# Patient Record
Sex: FEMALE | Race: BLACK OR AFRICAN AMERICAN | NOT HISPANIC OR LATINO | Employment: PART TIME | URBAN - METROPOLITAN AREA
[De-identification: names, ages, dates, MRNs, and addresses within clinical notes are randomized per-mention and may not be internally consistent; named-entity substitution may affect disease eponyms.]

---

## 2019-07-01 ENCOUNTER — TRANSFERRED RECORDS (OUTPATIENT)
Dept: MULTI SPECIALTY CLINIC | Facility: CLINIC | Age: 29
End: 2019-07-01

## 2019-07-01 LAB — PAP SMEAR - HIM PATIENT REPORTED: NEGATIVE

## 2019-08-03 ENCOUNTER — HOSPITAL ENCOUNTER (EMERGENCY)
Facility: CLINIC | Age: 29
Discharge: HOME | End: 2019-08-03
Attending: EMERGENCY MEDICINE | Admitting: EMERGENCY MEDICINE
Payer: COMMERCIAL

## 2019-08-03 ENCOUNTER — APPOINTMENT (OUTPATIENT)
Dept: GENERAL RADIOLOGY | Facility: CLINIC | Age: 29
End: 2019-08-03
Attending: EMERGENCY MEDICINE
Payer: COMMERCIAL

## 2019-08-03 VITALS
TEMPERATURE: 99.2 F | HEART RATE: 92 BPM | RESPIRATION RATE: 20 BRPM | OXYGEN SATURATION: 100 % | DIASTOLIC BLOOD PRESSURE: 82 MMHG | SYSTOLIC BLOOD PRESSURE: 111 MMHG

## 2019-08-03 DIAGNOSIS — J20.8 ACUTE VIRAL BRONCHITIS: ICD-10-CM

## 2019-08-03 PROCEDURE — 94640 AIRWAY INHALATION TREATMENT: CPT

## 2019-08-03 PROCEDURE — 71046 X-RAY EXAM CHEST 2 VIEWS: CPT

## 2019-08-03 PROCEDURE — 99283 EMERGENCY DEPT VISIT LOW MDM: CPT | Mod: 25

## 2019-08-03 PROCEDURE — 25000125 ZZHC RX 250: Performed by: EMERGENCY MEDICINE

## 2019-08-03 RX ORDER — IPRATROPIUM BROMIDE AND ALBUTEROL SULFATE 2.5; .5 MG/3ML; MG/3ML
3 SOLUTION RESPIRATORY (INHALATION) ONCE
Status: COMPLETED | OUTPATIENT
Start: 2019-08-03 | End: 2019-08-03

## 2019-08-03 RX ORDER — CODEINE PHOSPHATE AND GUAIFENESIN 10; 100 MG/5ML; MG/5ML
1 SOLUTION ORAL EVERY 4 HOURS PRN
Qty: 120 ML | Refills: 0 | Status: SHIPPED | OUTPATIENT
Start: 2019-08-03 | End: 2020-06-16

## 2019-08-03 RX ORDER — ALBUTEROL SULFATE 90 UG/1
2 AEROSOL, METERED RESPIRATORY (INHALATION)
Qty: 1 INHALER | Refills: 1 | Status: SHIPPED | OUTPATIENT
Start: 2019-08-03 | End: 2020-06-16

## 2019-08-03 RX ORDER — PREDNISONE 20 MG/1
40 TABLET ORAL DAILY
Qty: 10 TABLET | Refills: 0 | Status: SHIPPED | OUTPATIENT
Start: 2019-08-03 | End: 2019-09-03

## 2019-08-03 RX ADMIN — IPRATROPIUM BROMIDE AND ALBUTEROL SULFATE 3 ML: .5; 3 SOLUTION RESPIRATORY (INHALATION) at 18:21

## 2019-08-03 ASSESSMENT — ENCOUNTER SYMPTOMS
HEMATURIA: 0
SHORTNESS OF BREATH: 1
FEVER: 0
WHEEZING: 1
RHINORRHEA: 1
DYSURIA: 0
NAUSEA: 0
ABDOMINAL PAIN: 0
VOMITING: 0
COUGH: 1
FREQUENCY: 0
CHEST TIGHTNESS: 1

## 2019-08-03 NOTE — ED PROVIDER NOTES
History     Chief Complaint:  Cough    HPI   Baljinder Perdomo is a 29 year old female who presents for evaluation of a cough. Yesterday, the patient started to develop a dry unproductive cough and some rhinorrhea. That evening, the patient started to develop chest tightness, wheezing, and shortness of breath. Due to her persistent symptoms today, the patient came into the ED for evaluation. She reports similar symptoms in the past with pneumonia. She notes that she has previously had to use an inhaler but has not been diagnosed with asthma. Otherwise, she has not had any recent fever, ear pain, chest pain, leg swelling, abdominal pain, nausea, vomiting, dysuria, hematuria, or urinary frequency.     Allergies:  NKDA      Medications:    Tylenol    Past Medical History:    The patient denies any relevant past medical history.     Past Surgical History:    Breast augmentation surgery   Dental surgery     Family History:    History reviewed. No pertinent family history.     Social History:  Tobacco use:    Never smoker   Alcohol use:    Negative   Marital status:    Single   Accompanied to ED by:  Alone      Review of Systems   Constitutional: Negative for fever.   HENT: Positive for rhinorrhea. Negative for ear pain.    Respiratory: Positive for cough, chest tightness, shortness of breath and wheezing.    Cardiovascular: Negative for chest pain and leg swelling.   Gastrointestinal: Negative for abdominal pain, nausea and vomiting.   Genitourinary: Negative for dysuria, frequency and hematuria.   All other systems reviewed and are negative.      Physical Exam   First Vitals:  BP: 113/78  Heart Rate: 100  Temp: 99.2  F (37.3  C)  Resp: 20  SpO2: 99 %      Physical Exam  General: Well appearing, nontoxic. Resting comfortably  Head:  Scalp, face, and head appear normal  Eyes:  Pupils are equal, round, and reactive to light    Conjunctivae non-injected and sclerae white  ENT:    The external nose is normal    Pinnae are  normal. Bilateral TMs clear without erythema, bulging, or effusion. Auditory canals normal.     The oropharynx is normal, mucous membranes moist    Posterior pharynx clear without swelling, exudates or erythema     Uvula is in the midline  Neck:  Normal range of motion    There is no rigidity noted    Trachea is in the midline  CV:  Regular rate and rhythm     Normal S1/S2, no S3/S4    No murmur or rub. Radial pulses 2+ bilaterally   Resp:  Lungs are clear and equal bilaterally    There is no tachypnea    No increased work of breathing    No rales, wheezing, or rhonchi    + wheezing on forced expiratory maneuver.   GI:  Abdomen is soft, no rigidity or guarding    No distension, or mass    No tenderness or rebound tenderness   MS:  Normal muscular tone    Symmetric motor strength    No lower extremity edema  Skin:  No rash or acute skin lesions noted  Neuro:  Awake and alert    Speech is normal and fluent    Moves all extremities spontaneously  Psych: Normal affect.  Appropriate interactions.      Emergency Department Course     Imaging:  Radiographic findings were communicated with the patient who voiced understanding of the findings.    XR Chest:  FINDINGS: Mild thoracic curve. Normal heart size. Lungs clear.  Per radiology.     Interventions:  1821 Duoneb 3 mL Nebulization     Emergency Department Course:  Nursing notes and vitals reviewed.  1935: I performed an exam of the patient as documented above.     Findings and plan explained to the Patient. Patient discharged home with instructions regarding supportive care, medications, and reasons to return. The importance of close follow-up was reviewed. The patient was prescribed an Albuterol inhaler, Robitussin AC, and Prednisone.      Impression & Plan      Medical Decision Making:  Baljinder Perdomo is a 29 year old female who presented to the ED with symptoms consistent with bronchitis. The patient appears non-toxic without evidence of respiratory distress. The  patient has normal oxygen saturations with normal work of breathing. There are no signs of serious systemic illness and the patient has normal mentation without confusion. CXR normal without evidence of pneumonia, pleural effusion, or pneumothorax. Doubt PE given patient is not hypoxic or tachycardic in the setting of URI symptoms. Reviewed with patient that cough and airway hyperreactivity may persist for several weeks. Will treat symptomatically with albuterol inhaler, prednisone and cough suppressant. I discussed the need to seek immediate evaluation for respiratory distress, confusion, fever, or for any other questions or concerns. Recommended follow-up with primary care provider in three to five days if not improving. The patient was in agreement with plan and discharged in satisfactory condition with all questions answered.      Diagnosis:    ICD-10-CM   1. Acute viral bronchitis J20.8       Disposition:  Discharged to home with an Albuterol inhaler, Robitussin AC, and Prednisone.     Discharge Medications:  albuterol 108 (90 Base) MCG/ACT inhaler  Commonly known as:  PROAIR HFA/PROVENTIL HFA/VENTOLIN HFA  2 puffs, Inhalation, EVERY 2 HOURS PRN     guaiFENesin-codeine 100-10 MG/5ML solution  Commonly known as:  ROBITUSSIN AC  1 tsp., Oral, EVERY 4 HOURS PRN     predniSONE 20 MG tablet  Commonly known as:  DELTASONE  40 mg, Oral, DAILY            Adam SAMANO, am serving as a scribe at 7:35 PM on 8/3/2019 to document services personally performed by Dr. Schneider, based on my observations and the provider's statements to me.    [facility] EMERGENCY DEPARTMENT       Kike Schneider MD  08/04/19 [id number]

## 2019-08-03 NOTE — ED TRIAGE NOTES
Cough for the past 2 days, increased wheezing and tightness   Reports cough is non productive and dry     Denies fever

## 2019-08-03 NOTE — ED AVS SNAPSHOT
[facility] Emergency Department  [address]  [facility] [address]  Phone:  [phone]  Fax:  [phone]                                    Baljinder Perdomo   MRN: [id number]    Department:  [facility] Emergency Department   Date of Visit:  8/3/2019           After Visit Summary Signature Page    I have received my discharge instructions, and my questions have been answered. I have discussed any challenges I see with this plan with the nurse or doctor.    ..........................................................................................................................................  Patient/Patient Representative Signature      ..........................................................................................................................................  Patient Representative Print Name and Relationship to Patient    ..................................................               ................................................  Date                                   Time    ..........................................................................................................................................  Reviewed by Signature/Title    ...................................................              ..............................................  Date                                               Time          22EPIC Rev 08/18

## 2019-09-03 ENCOUNTER — OFFICE VISIT (OUTPATIENT)
Dept: INTERNAL MEDICINE | Facility: CLINIC | Age: 29
End: 2019-09-03
Payer: COMMERCIAL

## 2019-09-03 VITALS
SYSTOLIC BLOOD PRESSURE: 110 MMHG | WEIGHT: 101 LBS | BODY MASS INDEX: 17.89 KG/M2 | DIASTOLIC BLOOD PRESSURE: 60 MMHG | HEIGHT: 63 IN | TEMPERATURE: 98.8 F | RESPIRATION RATE: 18 BRPM | OXYGEN SATURATION: 98 % | HEART RATE: 100 BPM

## 2019-09-03 DIAGNOSIS — J21.9 ACUTE BRONCHIOLITIS WITH BRONCHOSPASM: Primary | ICD-10-CM

## 2019-09-03 PROCEDURE — 99203 OFFICE O/P NEW LOW 30 MIN: CPT | Performed by: INTERNAL MEDICINE

## 2019-09-03 RX ORDER — ALBUTEROL SULFATE 0.83 MG/ML
2.5 SOLUTION RESPIRATORY (INHALATION) EVERY 6 HOURS PRN
Qty: 90 VIAL | Refills: 1 | Status: SHIPPED | OUTPATIENT
Start: 2019-09-03 | End: 2020-06-16

## 2019-09-03 RX ORDER — AZITHROMYCIN 250 MG/1
TABLET, FILM COATED ORAL
Qty: 6 TABLET | Refills: 0 | Status: SHIPPED | OUTPATIENT
Start: 2019-09-03 | End: 2020-06-16

## 2019-09-03 RX ORDER — PREDNISONE 10 MG/1
TABLET ORAL
Qty: 15 TABLET | Refills: 0 | Status: SHIPPED | OUTPATIENT
Start: 2019-09-03 | End: 2020-06-16

## 2019-09-03 ASSESSMENT — MIFFLIN-ST. JEOR: SCORE: 1152.26

## 2019-09-03 ASSESSMENT — PAIN SCALES - GENERAL: PAINLEVEL: NO PAIN (0)

## 2019-09-03 NOTE — NURSING NOTE
"Chief Complaint   Patient presents with     Hospital F/U     ED 8/3/2019 , ongoing breathing isssues, cough, SOB with minimal activity. Albuterol inhaler helping some.      Physical     [facility].      initial /60 (BP Location: Right arm, Patient Position: Chair, Cuff Size: Adult Regular)   Pulse 100   Temp 98.8  F (37.1  C) (Oral)   Resp 18   Ht 1.6 m (5' 3\")   Wt 45.8 kg (101 lb)   LMP 08/29/2019   SpO2 98%   BMI 17.89 kg/m   Estimated body mass index is 17.89 kg/m  as calculated from the following:    Height as of this encounter: 1.6 m (5' 3\").    Weight as of this encounter: 45.8 kg (101 lb)..  bp completed using cuff size regular    "

## 2019-09-03 NOTE — PROGRESS NOTES
Dr Chaidez's note    Patient's instructions / PLAN:                                                        Plan:  1. Azithromycin 250 mg, take 2 tablets today, then 1 tablet a day for the next 4 days (  antibiotic)   2. Prednisone 10 mg   -- take 2 tabs daily for 5 days then take 1 tab daily for another 5 days   3. Albuterol nebulizer 3 times a day   Nebulizer : You may find it at :   [facility]   [address], [address]   [place] [address], [facility], [phone]     ASSESSMENT & PLAN:                                                      (J21.9) Acute bronchiolitis with bronchospasm  (primary encounter diagnosis)  Comment: Ongoing  Plan: azithromycin (ZITHROMAX) 250 MG tablet,         albuterol (PROVENTIL) (2.5 MG/3ML) 0.083% neb         solution, predniSONE (DELTASONE) 10 MG tablet,         order for DME        as above     Chief Complaint:                                                      Bronchitis     SUBJECTIVE:                                                    History of present illness      --  ED 8/3/19 - Bronchitis   --  UC 8/19/19 - Cough; Wheezing    Bronchitis  --  Prednisone 4 mg w/ some relief  --  She has not experienced these Sx before (family does not have same Sx)  --  Dry cough, no phlegm  --  She is having problems breathing   --  She wakes every 2 hrs, x 2 puffs of inhaler w/ little relief at night  --  Does not have nebulizer at home   --  x 1 months  --  She had no problems w/ exercising before  --  Recent mold exposure      She reports normal PAP 2018 - [facility]     ROS:                                                      ROS: negative for fever, chills,chest pain, vomiting, abdominal pain, leg swelling. Positive for  cough, wheezes, and shortness of breath    A 10-point review of systems was obtained.  Those pertinent are above and in the in the Subjective section.  The rest of the  "systems are negative.      This document serves as a record of the services and decisions personally performed and made by Kaylynn Davis MD. It was created on her behalf by Angelina Caldera, a trained medical scribe. The creation of this document is based on the provider's statements to the medical scribe.  Angelina Caledra September 3, 2019 11:31 AM      OBJECTIVE:         Physical exam:  /60 (BP Location: Right arm, Patient Position: Chair, Cuff Size: Adult Regular)   Pulse 100   Temp 98.8  F (37.1  C) (Oral)   Resp 18   Ht 1.6 m (5' 3\")   Wt 45.8 kg (101 lb)   LMP 08/29/2019   SpO2 98%   BMI 17.89 kg/m     NAD, appears comfortable  Skin: no rashes   HEENT: PERRLA, EOMI, pink conjunctiva, anicteric sclerae, bilateral tympanic membranes are clinically normal, oropharynx is normal color  Neck: supple, no JVD,  No thyroidmegaly. Lymph nodes nonpalpable cervical and supraclavicular.  Chest: clear to auscultation bilaterally, good respiratory effort. She coughs very frequently   Heart: S1 S2, RRR, no mgr appreciated  Abdomen: soft, not tender, no hepatosplenomegaly or masses appreciated, no abdominal bruit, present bowel sounds  Extremities: no edema,   Neurologic: A, Ox3, no focal signs appreciated    PMHx: reviewed  Past Medical History:   Diagnosis Date     NO ACTIVE PROBLEMS       PSHx: reviewed  Past Surgical History:   Procedure Laterality Date     BREAST SURGERY  2/15/2014    augmentation     DENTAL SURGERY  2014        Meds: reviewed  Current Outpatient Medications   Medication Sig Dispense Refill     albuterol (PROAIR HFA/PROVENTIL HFA/VENTOLIN HFA) 108 (90 Base) MCG/ACT inhaler Inhale 2 puffs into the lungs every 2 hours as needed for shortness of breath / dyspnea or wheezing 1 Inhaler 1     albuterol (PROVENTIL) (2.5 MG/3ML) 0.083% neb solution Take 1 vial (2.5 mg) by nebulization every 6 hours as needed for shortness of breath / dyspnea or wheezing 90 vial 1     azithromycin " (ZITHROMAX) 250 MG tablet Two tablets first day, then one tablet daily for four days. 6 tablet 0     guaiFENesin-codeine (ROBITUSSIN AC) 100-10 MG/5ML solution Take 5 mLs by mouth every 4 hours as needed for cough 120 mL 0     order for DME Equipment being ordered: Nebulizer 1 Device 0     predniSONE (DELTASONE) 10 MG tablet Take 2 tablets daily for 5 days then 1 tablet daily for 5 days 15 tablet 0     methocarbamol (ROBAXIN) 500 MG tablet Take 1 tablet (500 mg) by mouth 3 times daily as needed for muscle spasms (muscle pain/spasm) (Patient not taking: Reported on 9/3/2019) 30 tablet 0       Soc Hx: reviewed She doesn't smoke  Fam Hx: reviewed  Family History   Problem Relation Age of Onset     Family History Negative Mother      Family History Negative Father         The information in this document, created by the medical scribe for me, accurately reflects the services I personally performed and the decisions made by me. I have reviewed and approved this document for accuracy prior to leaving the patient care area.  September 3, 2019 11:47 AM    Susan Chaidez MD  Internal Medicine

## 2019-09-03 NOTE — PATIENT INSTRUCTIONS
Plan:  1. Azithromycin 250 mg, take 2 tablets today, then 1 tablet a day for the next 4 days (  antibiotic)   2. Prednisone 10 mg   -- take 2 tabs daily for 5 days then take 1 tab daily for another 5 days   3. Albuterol nebulizer 3 times a day   Nebulizer : You may find it at :   [facility]   [address], [address]   [place] [address], [place], [phone]

## 2019-09-14 ENCOUNTER — NURSE TRIAGE (OUTPATIENT)
Dept: NURSING | Facility: CLINIC | Age: 29
End: 2019-09-14

## 2019-09-14 NOTE — TELEPHONE ENCOUNTER
"Clinic Action Needed:No    Reason for Call: \"I'm pretty sure that I have a yeast infection, can you call me in a prescription\".  Advised caller that I'm not able to treat over the phone.  Offered Triage, she declined.  She asked what she should do.  I advised that if I could triage her, I'd be able to make a better recommendation.  She again declined, triage, I told her her options over the weekend would be either UC/ER.  Urgent care should be sufficient, however I can't make a complete recommendation with out proper triage.  Caller said, thanks, I know where I will go and ended call.     Routed to: Not routed.    Esme Turner RN  [facility]      "

## 2020-03-02 ENCOUNTER — HEALTH MAINTENANCE LETTER (OUTPATIENT)
Age: 30
End: 2020-03-02

## 2020-06-16 ENCOUNTER — VIRTUAL VISIT (OUTPATIENT)
Dept: INTERNAL MEDICINE | Facility: CLINIC | Age: 30
End: 2020-06-16
Payer: COMMERCIAL

## 2020-06-16 VITALS
HEART RATE: 89 BPM | DIASTOLIC BLOOD PRESSURE: 74 MMHG | RESPIRATION RATE: 18 BRPM | WEIGHT: 100 LBS | TEMPERATURE: 98.5 F | SYSTOLIC BLOOD PRESSURE: 115 MMHG | BODY MASS INDEX: 17.72 KG/M2 | OXYGEN SATURATION: 99 % | HEIGHT: 63 IN

## 2020-06-16 DIAGNOSIS — R59.1 HEAD AND NECK LYMPHADENOPATHY: ICD-10-CM

## 2020-06-16 DIAGNOSIS — R39.198 DIFFICULTY URINATING: Primary | ICD-10-CM

## 2020-06-16 PROCEDURE — 99214 OFFICE O/P EST MOD 30 MIN: CPT | Performed by: INTERNAL MEDICINE

## 2020-06-16 RX ORDER — SULFAMETHOXAZOLE/TRIMETHOPRIM 800-160 MG
1 TABLET ORAL 2 TIMES DAILY
Qty: 10 TABLET | Refills: 0 | Status: SHIPPED | OUTPATIENT
Start: 2020-06-16 | End: 2021-02-22

## 2020-06-16 ASSESSMENT — MIFFLIN-ST. JEOR: SCORE: 1142.73

## 2020-06-16 NOTE — PATIENT INSTRUCTIONS
Plan:  1. Urine test today -  - [address],  Bactrim antibiotic 1 tablet twice a day  2. If  symptoms persist - make an appointment with urology doctor  3. If the lymph node persists in 3-4 weeks please schedule neck ultrasound including the thyroid

## 2020-06-16 NOTE — PROGRESS NOTES
"  Patient's instructions / PLAN:                                                        Plan:  1. Urine test today -  - [address],  Bactrim antibiotic 1 tablet twice a day  2. If  symptoms persist - make an appointment with urology doctor  3. If the lymph node persists in 3-4 weeks please schedule neck ultrasound including the thyroid    To schedule this test you may call Scheduling center at [phone]         ASSESSMENT & PLAN:                                                      (R34.210) Difficulty urinating  (primary encounter diagnosis)  Comment:   Plan: UA with Microscopic reflex to Culture, UROLOGY         ADULT REFERRAL, sulfamethoxazole-trimethoprim         (BACTRIM DS) 800-160 MG tablet            (R59.1) Head and neck lymphadenopathy  Comment:   Plan: US Head Neck Soft Tissue        as above        Chief Complaint:                                                      Lymph node  Urine symptoms      SUBJECTIVE:                                                    History of present illness     Neck nodule  -- on the R side  -- she noticed it 3 days ago. 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  Those pertinent are above and in the in the Subjective section.  The rest of the systems are negative.        OBJECTIVE:                                                    Physical Exam :    Blood pressure 115/74, pulse 89, temperature 98.5  F (36.9  C), temperature source Oral, resp. rate 18, height 1.6 m (5' 3\"), " weight 45.4 kg (100 lb), SpO2 99 %, not currently breastfeeding.   NAD, appears comfortable  Skin: no rashes   Neck: supple, no JVD, No thyroidmegaly. There is 1 cm soft nodule at the R mandibula angle Lymph nodes nonpalpable cervical and supraclavicular.  Chest: clear to auscultation bilaterally, good respiratory effort  Heart: S1 S2, RRR, no mgr appreciated  Abdomen: soft, not tender, no hepatosplenomegaly or masses appreciated, no abdominal bruit, present bowel sounds  Extremities: no edema,   Neurologic: A, Ox3, no focal signs appreciated    PMHx: reviewed  Past Medical History:   Diagnosis Date     NO ACTIVE PROBLEMS       PSHx: reviewed  Past Surgical History:   Procedure Laterality Date     BREAST SURGERY  2/15/2014    augmentation     DENTAL SURGERY  2014        Meds: reviewed  No current outpatient medications on file.       Soc Hx: reviewed  Fam Hx: reviewed          Susan Chaidez MD  Internal Medicine

## 2020-06-17 ENCOUNTER — TELEPHONE (OUTPATIENT)
Dept: UROLOGY | Facility: CLINIC | Age: 30
End: 2020-06-17

## 2020-06-17 NOTE — TELEPHONE ENCOUNTER
[facility] Call Center    Phone Message    May a detailed message be left on voicemail: yes     Reason for Call: Appointment Intake    Referring Provider Name: Susan Wolfe   Diagnosis and/or Symptoms: Difficulty urinating - per covid protocol, please review    Action Taken: Message routed to:  [facility] ([facility] Urology    Travel Screening: Not Applicable

## 2020-09-16 ENCOUNTER — APPOINTMENT (OUTPATIENT)
Dept: ULTRASOUND IMAGING | Facility: CLINIC | Age: 30
End: 2020-09-16
Attending: PHYSICIAN ASSISTANT
Payer: COMMERCIAL

## 2020-09-16 ENCOUNTER — HOSPITAL ENCOUNTER (EMERGENCY)
Facility: CLINIC | Age: 30
Discharge: HOME | End: 2020-09-16
Attending: PHYSICIAN ASSISTANT | Admitting: PHYSICIAN ASSISTANT
Payer: COMMERCIAL

## 2020-09-16 VITALS
DIASTOLIC BLOOD PRESSURE: 75 MMHG | SYSTOLIC BLOOD PRESSURE: 112 MMHG | HEART RATE: 78 BPM | BODY MASS INDEX: 18.07 KG/M2 | WEIGHT: 102 LBS | TEMPERATURE: 98.3 F | OXYGEN SATURATION: 100 % | RESPIRATION RATE: 16 BRPM

## 2020-09-16 DIAGNOSIS — N93.9 VAGINAL BLEEDING: ICD-10-CM

## 2020-09-16 DIAGNOSIS — N94.6 PAINFUL MENSTRUATION: ICD-10-CM

## 2020-09-16 LAB
ABO + RH BLD: NORMAL
ABO + RH BLD: NORMAL
ALBUMIN UR-MCNC: 30 MG/DL
ANION GAP SERPL CALCULATED.3IONS-SCNC: 7 MMOL/L (ref 3–14)
APPEARANCE UR: CLEAR
B-HCG FREE SERPL-ACNC: <5 IU/L
BASOPHILS # BLD AUTO: 0 10E9/L (ref 0–0.2)
BASOPHILS NFR BLD AUTO: 1 %
BILIRUB UR QL STRIP: NEGATIVE
BLD GP AB SCN SERPL QL: NORMAL
BLOOD BANK CMNT PATIENT-IMP: NORMAL
BUN SERPL-MCNC: 16 MG/DL (ref 7–30)
CALCIUM SERPL-MCNC: 9.2 MG/DL (ref 8.5–10.1)
CHLORIDE SERPL-SCNC: 111 MMOL/L (ref 94–109)
CO2 SERPL-SCNC: 21 MMOL/L (ref 20–32)
COLOR UR AUTO: ABNORMAL
CREAT SERPL-MCNC: 0.74 MG/DL (ref 0.52–1.04)
DIFFERENTIAL METHOD BLD: NORMAL
EOSINOPHIL # BLD AUTO: 0.2 10E9/L (ref 0–0.7)
EOSINOPHIL NFR BLD AUTO: 4.2 %
ERYTHROCYTE [DISTWIDTH] IN BLOOD BY AUTOMATED COUNT: 11.9 % (ref 10–15)
GFR SERPL CREATININE-BSD FRML MDRD: >90 ML/MIN/{1.73_M2}
GLUCOSE SERPL-MCNC: 95 MG/DL (ref 70–99)
GLUCOSE UR STRIP-MCNC: NEGATIVE MG/DL
HCT VFR BLD AUTO: 42.9 % (ref 35–47)
HGB BLD-MCNC: 13.7 G/DL (ref 11.7–15.7)
HGB UR QL STRIP: ABNORMAL
IMM GRANULOCYTES # BLD: 0 10E9/L (ref 0–0.4)
IMM GRANULOCYTES NFR BLD: 0.7 %
KETONES UR STRIP-MCNC: ABNORMAL MG/DL
LEUKOCYTE ESTERASE UR QL STRIP: NEGATIVE
LYMPHOCYTES # BLD AUTO: 1.1 10E9/L (ref 0.8–5.3)
LYMPHOCYTES NFR BLD AUTO: 27.3 %
MCH RBC QN AUTO: 31.2 PG (ref 26.5–33)
MCHC RBC AUTO-ENTMCNC: 31.9 G/DL (ref 31.5–36.5)
MCV RBC AUTO: 98 FL (ref 78–100)
MONOCYTES # BLD AUTO: 0.3 10E9/L (ref 0–1.3)
MONOCYTES NFR BLD AUTO: 6.7 %
MUCOUS THREADS #/AREA URNS LPF: PRESENT /LPF
NEUTROPHILS # BLD AUTO: 2.4 10E9/L (ref 1.6–8.3)
NEUTROPHILS NFR BLD AUTO: 60.1 %
NITRATE UR QL: NEGATIVE
NRBC # BLD AUTO: 0 10*3/UL
NRBC BLD AUTO-RTO: 0 /100
PH UR STRIP: 6 PH (ref 5–7)
PLATELET # BLD AUTO: 199 10E9/L (ref 150–450)
POTASSIUM SERPL-SCNC: 3.6 MMOL/L (ref 3.4–5.3)
RBC # BLD AUTO: 4.39 10E12/L (ref 3.8–5.2)
RBC #/AREA URNS AUTO: <1 /HPF (ref 0–2)
SODIUM SERPL-SCNC: 139 MMOL/L (ref 133–144)
SOURCE: ABNORMAL
SP GR UR STRIP: 1.02 (ref 1–1.03)
SPECIMEN EXP DATE BLD: NORMAL
SQUAMOUS #/AREA URNS AUTO: 2 /HPF (ref 0–1)
UROBILINOGEN UR STRIP-MCNC: NORMAL MG/DL (ref 0–2)
WBC # BLD AUTO: 4 10E9/L (ref 4–11)
WBC #/AREA URNS AUTO: 1 /HPF (ref 0–5)

## 2020-09-16 PROCEDURE — 81001 URINALYSIS AUTO W/SCOPE: CPT | Performed by: PHYSICIAN ASSISTANT

## 2020-09-16 PROCEDURE — 85025 COMPLETE CBC W/AUTO DIFF WBC: CPT | Performed by: PHYSICIAN ASSISTANT

## 2020-09-16 PROCEDURE — 80048 BASIC METABOLIC PNL TOTAL CA: CPT | Performed by: PHYSICIAN ASSISTANT

## 2020-09-16 PROCEDURE — 86901 BLOOD TYPING SEROLOGIC RH(D): CPT | Performed by: PHYSICIAN ASSISTANT

## 2020-09-16 PROCEDURE — 84702 CHORIONIC GONADOTROPIN TEST: CPT

## 2020-09-16 PROCEDURE — 99285 EMERGENCY DEPT VISIT HI MDM: CPT | Mod: 25

## 2020-09-16 PROCEDURE — 76830 TRANSVAGINAL US NON-OB: CPT

## 2020-09-16 PROCEDURE — 87491 CHLMYD TRACH DNA AMP PROBE: CPT | Performed by: PHYSICIAN ASSISTANT

## 2020-09-16 PROCEDURE — 86850 RBC ANTIBODY SCREEN: CPT | Performed by: PHYSICIAN ASSISTANT

## 2020-09-16 PROCEDURE — 87591 N.GONORRHOEAE DNA AMP PROB: CPT | Performed by: PHYSICIAN ASSISTANT

## 2020-09-16 PROCEDURE — 86900 BLOOD TYPING SEROLOGIC ABO: CPT | Performed by: PHYSICIAN ASSISTANT

## 2020-09-16 PROCEDURE — 25000132 ZZH RX MED GY IP 250 OP 250 PS 637: Performed by: PHYSICIAN ASSISTANT

## 2020-09-16 RX ORDER — ACETAMINOPHEN 325 MG/1
975 TABLET ORAL ONCE
Status: COMPLETED | OUTPATIENT
Start: 2020-09-16 | End: 2020-09-16

## 2020-09-16 RX ADMIN — ACETAMINOPHEN 975 MG: 325 TABLET, FILM COATED ORAL at 10:19

## 2020-09-16 ASSESSMENT — ENCOUNTER SYMPTOMS
BACK PAIN: 1
NAUSEA: 0
FEVER: 1

## 2020-09-16 NOTE — DISCHARGE INSTRUCTIONS
Tylenol and ibuprofen at home for pain control. You can take up to 1000 mg or 1 g of Tylenol.  You can take 600 mg of ibuprofen at one time.  You can take these together every 6 hours if needed.  Always take ibuprofen with food.  Never take more than 4 g (4000 mg) of Tylenol in 1 day.  Do not take this amount for more than 1 week at a time. Return for bleeding through more than a pad an hour for more than 5-6 hours, severe or changing abdominal pain, fevers, new concerns. Your exam and workup is reassuring today.

## 2020-09-16 NOTE — ED PROVIDER NOTES
History   Chief Complaint:  Vaginal Bleeding    HPI   Baljinder Perdomo is a 30 year old Rh negative female who presents for evaluation of vaginal bleeding, associated with throbbing pelvic pain and low back pain, that began this morning. The patient states her last menstrual period ended on August 20th and last had vaginal intercourse with a male on August 27th. Since then, she has had breast tenderness and she was concerned she might be pregnant. Last night she had vaginal intercourse with a male partner and had pain, which is unusual for her, as well as pink-tingled discharge. She denies any instrumentation use. This morning she work up with throbbing pelvic pain as well as low back pain. She also began having vaginal bleeding, which is worse than her typical menstrual cycle. Due to her pain and heavier bleeding, she presented for evaluation.     Here, the patient also endorses diarrhea as well as a subjective fever. She notes she is expecting her menstrual cycle soon. She denies any nausea, objective fever, abnormal vaginal discharge, or other symptoms prompting her presentation. She is also not concerned for STDs.    Of note, the patient's OBGYN is Dr. Denny, DO through [facility].     Allergies:  Metronidazole     Medications:   The patient is not currently taking any prescribed medications.     Past Medical History:    Rh negative     Past Surgical History:    Breast augmentation  Dental surgery      Family History:    The patient denies any relevant family medical history.     Social History:  The patient was unaccompanied to the ED.  Smoking Status: Never Smoker  Smokeless Tobacco: Never Used  Alcohol Use: No  Drug Use: Yes - Marijuana   PCP: [facility]   Marital Status:     Review of Systems   Constitutional: Positive for fever (Subjective).   Gastrointestinal: Negative for nausea.   Genitourinary: Positive for pelvic pain and vaginal bleeding. Negative for vaginal discharge.    Musculoskeletal: Positive for back pain.   All other systems reviewed and are negative.    Physical Exam     Patient Vitals for the past 24 hrs:   BP Temp Temp src Pulse Resp SpO2 Weight   09/16/20 1200 112/75 -- -- 78 16 100 % --   09/16/20 0927 110/76 98.3  F (36.8  C) Oral 98 16 100 % 46.3 kg (102 lb)     Physical Exam  General: Well appearing, well nourished. Normal mood and affect.  Skin: Good turgor, no rash, no unusual bruising or prominent lesions.  HEENT: Head: Normocephalic, atraumatic, no visible masses.   Eyes: Conjunctiva clear.  Cardiac: Normal rate and regular rhythm, no murmur or gallop.   Lungs: Clear to auscultation.  Abdomen: Mild suprapubic pain.  No pain McBurney's.  Negative Martinez's.  No CVA tenderness bilaterally.  Musculoskeletal: Normal gait and station. No calf tenderness or swelling.   Neurologic: Oriented x 3. GCS: 15.  Psychiatric: Intact recent and remote memory, judgment and insight, normal mood and affect.   Pelvic: External genitalia without lesions or abnormalities.  Mild amount of bleeding within the vaginal vault.  Unable to visualize cervix as patient is poorly able to tolerate exam due to pain.    Emergency Department Course   Imaging:  Radiology findings were communicated with the patient who voiced understanding of the findings.    US Pelvis Cmplt w Transvag & Doppler LmtPel Duplex Limited   IMPRESSION:  Normal pelvic ultrasound. No evidence of torsion.  Reading per radiology.     Laboratory:  Laboratory findings were communicated with the patient who voiced understanding of the findings.    CBC: AWNL (WBC 4.0, HGB 13.7, )  BMP: Chloride 111 (H) o/w WNL (Creatinine 0.74)   ISTAT HCG Quantitative: <5.0     ABO/Rh type and screen: B Rh Negative. Antibodies Negative     UA with Microscopic: Ketones Trace (A), Blood Small (A), Protein albumin 30 (A), Squamous epithelial/HPF 2 (H), Mucous Present (A) o/w WNL     Chlamydia trachomatis PCR: Pending  Neisseria gonorrhea PCR:  Pending  Trichomonas vaginalis DNA PCR: Pending     Interventions:  1019 Tylenol 975 mg PO    Emergency Department Course:  Past medical records, nursing notes, and vitals reviewed.  The patient was sent for a US Pelvis Cmplt w Transvag & Doppler LmtPel Duplex Limited while in the emergency department, results above.    IV was inserted and blood was drawn for laboratory testing, results above.   The patient provided a urine sample here in the emergency department. This was sent for laboratory testing, findings above.     (2073)   I performed an exam of the patient as documented above. History obtained from patient.     (1005)   I performed a pelvic exam on the patient in the presence of a female chaperone.     (1132)   I spoke with Dr. Dukes of the OBGYN service regarding patient's presentation, findings, and plan of care. He will come see the patient in the ED and perform a pelvic exam.     (1150)   I spoke with Dr. Dukes here in the ED after he evaluated the patient.     (1210)   I rechecked the patient and discussed results and plan of care.     Findings and plan explained to the Patient. Patient discharged home with instructions regarding supportive care, medications, and reasons to return. The importance of close follow-up was reviewed. I personally reviewed the laboratory and imaging results with the Patient and answered all related questions prior to discharge.     Impression & Plan     Medical Decision Making:  Baljinder Perdomo is a 30 year old female who presents with pelvic pain and vaginal bleeding.   She is currently not pregnant.  She appears nontoxic and has a reassuring exam with no significant bleeding on exam.  A broad differential diagnosis was considered including colitis, appendicitis, intestinal cramping, pyelonephritis, UTI, kidney stone, constipation, diverticulitis, endometriosis, obstruction, ovarian cyst (enlarged or ruptured), ovarian torsion,  PID, TOA, as most likely possibilities. She  has no fevers or diarrhea to suggest colitis or diverticultis.  UA shows no signs of UTI, kidney stone or pyelonephritis.  She is clinically not constipated.  There are no large cysts on US to suggest ovarian torsion and no signs of recent ruptured cyst.  No signs on exam of PID or TOA. The workup in the ED is at this point negative.  Pelvic exam initially, it was difficult due to her discomfort.  There is no active visualized hemorrhage, but small amount of blood noted within the vaginal vault.  Given her pain and bleeding after intercourse, I did have concern for vaginal laceration.  As she had tolerated the exam poorly initially, did not wish to have anything stronger than Tylenol, then consulted with OB for their input.  Dr. Smalls graciously came to the ER to perform an exam, which patient was able to tolerate much better.  No visualized vaginal laceration or other abnormality.    Suspect painful.  As patient symptoms of pain.  OB agrees with this.  My suspicion of an intraabdominal catastrophe or other worrisome etiology is very low.  Patient is hemodynamically stable in ED. she will continue to monitor at home, Tylenol ibuprofen for pain control as needed.  Follow-up with PCP and OB if not improving.  Return to the ER for change or worsening symptoms, worsening pain, persistent vomiting, fevers, weakness, heavy vaginal bleeding (through more than a pad an hour for more than 5 to 6 hours), new concerns.  STD panel is currently pending, however patient is very low suspicion for this.  She will return or seek medical intervention if these are positive.  Low concern for PID at this point.  All questions answered prior to discharge.  She is in agreement with the treatment plan see above.    Diagnosis:    ICD-10-CM    1. Vaginal bleeding  N93.9 CANCELED: Wet prep   2. Painful menstruation  N94.6      Disposition:  Discharged to home.    Scribe Disclosure:  Farhat SAMANO, am serving as a scribe at 9:43 AM on  9/16/2020 to document services personally performed by Luz Virk PA based on my observations and the provider's statements to me.  September 16, 2020   [facility] [place] EMERGENCY DEPARTMENT    Dragon Disclosure   This was created at least in part with a voice recognition software. Mistakes/typos may be present.          Luz Virk PA  09/16/20 1911

## 2020-09-16 NOTE — ED TRIAGE NOTES
Patient reports pelvic pain and low back pain along with heavy vaginal bleeding that started this morning. She has had breast tenderness and was not due to get her period tomorrow or next day. She is concerned she may be pregnant, has not been using any form of birth control.

## 2020-09-16 NOTE — ED AVS SNAPSHOT
[facility] Emergency Department  [address]  [facility] [address]  Phone:  [phone]  Fax:  [phone]                                    Baljinder Perdomo   MRN: [id number]    Department:  [facility] Emergency Department   Date of Visit:  9/16/2020           After Visit Summary Signature Page    I have received my discharge instructions, and my questions have been answered. I have discussed any challenges I see with this plan with the nurse or doctor.    ..........................................................................................................................................  Patient/Patient Representative Signature      ..........................................................................................................................................  Patient Representative Print Name and Relationship to Patient    ..................................................               ................................................  Date                                   Time    ..........................................................................................................................................  Reviewed by Signature/Title    ...................................................              ..............................................  Date                                               Time          22EPIC Rev 08/18

## 2020-09-17 LAB
C TRACH DNA SPEC QL NAA+PROBE: NEGATIVE
N GONORRHOEA DNA SPEC QL NAA+PROBE: NEGATIVE
SPECIMEN SOURCE: NORMAL
SPECIMEN SOURCE: NORMAL

## 2020-12-14 ENCOUNTER — HEALTH MAINTENANCE LETTER (OUTPATIENT)
Age: 30
End: 2020-12-14

## 2021-02-22 ENCOUNTER — VIRTUAL VISIT (OUTPATIENT)
Dept: INTERNAL MEDICINE | Facility: CLINIC | Age: 31
End: 2021-02-22
Payer: COMMERCIAL

## 2021-02-22 ENCOUNTER — TELEPHONE (OUTPATIENT)
Dept: INTERNAL MEDICINE | Facility: CLINIC | Age: 31
End: 2021-02-22

## 2021-02-22 VITALS — BODY MASS INDEX: 18.61 KG/M2 | HEIGHT: 63 IN | WEIGHT: 105 LBS

## 2021-02-22 DIAGNOSIS — M79.673 ARCH PAIN, UNSPECIFIED LATERALITY: Primary | ICD-10-CM

## 2021-02-22 PROCEDURE — 99213 OFFICE O/P EST LOW 20 MIN: CPT | Mod: 95 | Performed by: NURSE PRACTITIONER

## 2021-02-22 RX ORDER — NAPROXEN 500 MG/1
500 TABLET ORAL 2 TIMES DAILY PRN
Qty: 30 TABLET | Refills: 0 | Status: SHIPPED | OUTPATIENT
Start: 2021-02-22 | End: 2021-09-01

## 2021-02-22 ASSESSMENT — MIFFLIN-ST. JEOR: SCORE: 1165.41

## 2021-02-22 NOTE — TELEPHONE ENCOUNTER
Pt calling requesting call back from nurse to discuss foot pain she is experiencing in both feet. Pt can be reached at [phone]. Please advise. Thanks.

## 2021-02-22 NOTE — PROGRESS NOTES
Baljinder is a 30 year old who is being evaluated via a billable video visit.      How would you like to obtain your AVS? MyChart  If the video visit is dropped, the invitation should be resent by: Text to cell phone: [phone]  Will anyone else be joining your video visit? No      Video Start Time: 4:04 PM    Assessment & Plan     Arch pain, unspecified laterality  - suspect Plantar Fasciitis  - XR Foot Bilateral G/E 3 Views; Future  - naproxen (NAPROSYN) 500 MG tablet; Take 1 tablet (500 mg) by mouth 2 times daily as needed for moderate pain  - Orthopedic & Spine  Referral; Future    [id number]}     Patient Instructions   Call Radiology and schedule for x-ray of both feet    Prescription given for anti-inflammatory called Naproxen 500 mg bid x 7 days with food then as needed for pain and inflammation    Suggested icing feet may help    Good supportive shoes with inserts may help    Avoid standing in 1 position all the time.      Return if symptoms worsen or fail to improve.    Lida eHrrera CNP  [facility]   Baljinder is a 30 year old who presents for the following health issues     HPI       She has burning and shooting pain from her heels all the way to her toes. She has these symptoms for almost a week and a half.    See above.  Reports pain in arches and edgardo of feet that seem to be getting worse this past week.  She works as a  and  so stands a lot.  States went out and bought some good supportive shoes helping some.  Left is worse than right.  Reports the first step out of bed is very painful. Also has high arches.        Review of Systems   Constitutional, HEENT, cardiovascular, pulmonary, gi and gu systems are negative, except as otherwise noted.      Objective           Vitals:  No vitals were obtained today due to virtual visit.    Physical Exam   GENERAL: alert and no distress  MS: No gross musculoskeletal defects noted.  Normal range of  motion.  No visible edema.  Noted high arches.  SKIN: Visible skin clear. No significant rash, abnormal pigmentation or lesions.            Video-Visit Details    Type of service:  Video Visit    Video End Time:4:14 PM    Originating Location (pt. Location): Home    Distant Location (provider location):  [facility]     Platform used for Video Visit: [employer]

## 2021-02-22 NOTE — TELEPHONE ENCOUNTER
Patient is on her feet more over the last month at work.  Pain on bottoms of feet, ball of foot.  Pain shoots from heel to great toe.  Worse while on her feet but also occurs at rest.  Onset about 7-10 days ago.  Feel better after using Epsom salt soaks, both warm and cold.  Has bought new shoes and inserts which helped only the first couple of days.  Advised she make appt to be seen and discuss with provider. Cherry Daly R.N.

## 2021-02-22 NOTE — PATIENT INSTRUCTIONS
Call Radiology and schedule for x-ray of both feet    Prescription given for anti-inflammatory called Naproxen 500 mg bid x 7 days with food then as needed for pain and inflammation    Suggested icing feet may help    Good supportive shoes with inserts may help    Avoid standing in 1 position all the time.    Referral to Podiatry (they will be calling for appointment)

## 2021-02-23 ENCOUNTER — HOSPITAL ENCOUNTER (OUTPATIENT)
Dept: GENERAL RADIOLOGY | Facility: CLINIC | Age: 31
Discharge: HOME | End: 2021-02-23
Attending: NURSE PRACTITIONER | Admitting: NURSE PRACTITIONER
Payer: COMMERCIAL

## 2021-02-23 ENCOUNTER — MYC MEDICAL ADVICE (OUTPATIENT)
Dept: INTERNAL MEDICINE | Facility: CLINIC | Age: 31
End: 2021-02-23

## 2021-02-23 DIAGNOSIS — M79.673 ARCH PAIN, UNSPECIFIED LATERALITY: ICD-10-CM

## 2021-02-23 PROCEDURE — 73630 X-RAY EXAM OF FOOT: CPT | Mod: 50

## 2021-03-03 ENCOUNTER — OFFICE VISIT (OUTPATIENT)
Dept: PODIATRY | Facility: CLINIC | Age: 31
End: 2021-03-03
Attending: NURSE PRACTITIONER
Payer: COMMERCIAL

## 2021-03-03 VITALS
WEIGHT: 106.8 LBS | DIASTOLIC BLOOD PRESSURE: 62 MMHG | SYSTOLIC BLOOD PRESSURE: 102 MMHG | BODY MASS INDEX: 18.92 KG/M2 | HEIGHT: 63 IN

## 2021-03-03 DIAGNOSIS — M77.42 METATARSALGIA OF BOTH FEET: Primary | ICD-10-CM

## 2021-03-03 DIAGNOSIS — M72.2 PLANTAR FASCIITIS, BILATERAL: ICD-10-CM

## 2021-03-03 DIAGNOSIS — M79.673 ARCH PAIN, UNSPECIFIED LATERALITY: ICD-10-CM

## 2021-03-03 DIAGNOSIS — M77.41 METATARSALGIA OF BOTH FEET: Primary | ICD-10-CM

## 2021-03-03 PROCEDURE — 99243 OFF/OP CNSLTJ NEW/EST LOW 30: CPT | Performed by: PODIATRIST

## 2021-03-03 ASSESSMENT — MIFFLIN-ST. JEOR: SCORE: 1173.57

## 2021-03-03 NOTE — PROGRESS NOTES
ASSESSMENT:  Encounter Diagnoses   Name Primary?     Arch pain, unspecified laterality      Metatarsalgia of both feet Yes     Plantar fasciitis, bilateral      MEDICAL DECISION MAKING:  I discussed the relevant anatomy involving the regions of her pain.    Given time spent on her feet, if not treated, her pain will likely persist and perhaps worsen.    The patient was educated about the causes and nature of arch and heel pain.  The anatomy and function of the plantar fascia was discussed.  The treatment plan discussed included icing, calf and plantar fascial stretching, avoidance of barefoot walking, wearing sturdy, supportive, stiffer-soled athletic-type shoes, activity modification, and over-the-counter arch supports versus custom orthoses.  A comprehensive After-Visit Summary was provided.     Baljinder Perdomo is referred to [facility] for prescription orthoses.      I discussed how stiffer soled shoes can help offload her forefoot during the propulsive phase of gait.  We discussed the use of metatarsal pads.    Follow up in 1 month    Disclaimer: This note consists of symbols derived from keyboarding, dictation and/or voice recognition software. As a result, there may be errors in the script that have gone undetected. Please consider this when interpreting information found in this chart.    Delonte Ellsworth DPM, FACFAS, MS    [facility] & [facility]le [facility]      ____________________________________________________________________    HPI:       I was asked by Lida Herrera CNP to evaluate Baljinder Perdomo in consultation for bilateral foot pain.     Chief Complaint: pain in both feet  Onset of problem: 18 days  Pain/ discomfort is described as:  Burning and deep ache  Pain Ratin-9/10 at worst   Frequency:  daily    The pain is exacerbated by working on her feet 6 hours as a   Previous treatment: ice, warm soaks  She specified plantar forefoot pain and  "plantar medial heel pain.    Past Medical History:   Diagnosis Date     NO ACTIVE PROBLEMS    *  *  Past Surgical History:   Procedure Laterality Date     BREAST SURGERY  2/15/2014    augmentation     DENTAL SURGERY  2014   *  *  Current Outpatient Medications   Medication Sig Dispense Refill     naproxen (NAPROSYN) 500 MG tablet Take 1 tablet (500 mg) by mouth 2 times daily as needed for moderate pain 30 tablet 0       EXAM:    Vitals: /62   Ht 1.6 m (5' 3\")   Wt 48.4 kg (106 lb 12.8 oz)   LMP 02/08/2021 (Exact Date)   BMI 18.92 kg/m    BMI: Body mass index is 18.92 kg/m .    Constitutional:  Baljinder Perdomo is in no apparent distress, appears well-nourished.  Cooperative with history and physical exam.    Vascular:  Pedal pulses are palpable for both the DP and PT arteries.  CFT < 3 sec.  No edema.      Neuro: Light touch sensation is intact to the L4, L5, S1 distributions  No evidence of weakness, spasticity, or contracture in the lower extremities.     Derm: Normal texture and turgor.  No erythema, ecchymosis, or cyanosis.  No open lesions.     Musculoskeletal:    Lower extremity muscle strength is normal. No gross deformities.  Pain on palpation: Pain on palpation to the plantar medial aspect of the bilateral heel.  No significant pain with side-to-side compression of the heel.  No nodularity noted.  I palpated below the bilateral second metatarsal phalangeal joint.  This cause discomfort and she said that was the exact spot that is been causing pain.      "

## 2021-03-03 NOTE — LETTER
3/3/2021         RE: Baljinder Perdomo  [address] [address] [facility]  [address] [place] [id number]        Dear Colleague,    Thank you for referring your patient, Baljinder Perdomo, to the [facility]. Please see a copy of my visit note below.    ASSESSMENT:  Encounter Diagnoses   Name Primary?     Arch pain, unspecified laterality      Metatarsalgia of both feet Yes     Plantar fasciitis, bilateral      MEDICAL DECISION MAKING:  I discussed the relevant anatomy involving the regions of her pain.    Given time spent on her feet, if not treated, her pain will likely persist and perhaps worsen.    The patient was educated about the causes and nature of arch and heel pain.  The anatomy and function of the plantar fascia was discussed.  The treatment plan discussed included icing, calf and plantar fascial stretching, avoidance of barefoot walking, wearing sturdy, supportive, stiffer-soled athletic-type shoes, activity modification, and over-the-counter arch supports versus custom orthoses.  A comprehensive After-Visit Summary was provided.     Baljinder Perdomo is referred to [facility] for prescription orthoses.      I discussed how stiffer soled shoes can help offload her forefoot during the propulsive phase of gait.  We discussed the use of metatarsal pads.    Follow up in 1 month    Disclaimer: This note consists of symbols derived from keyboarding, dictation and/or voice recognition software. As a result, there may be errors in the script that have gone undetected. Please consider this when interpreting information found in this chart.    Delonte Ellsworth DPM, FACFAS, MS    [facility] of Pod[facility]ry/[facility] & [facility]le Surgery      ____________________________________________________________________    HPI:       I was asked by Lida Herrera CNP to evaluate Baljinder Perdomo in consultation for bilateral foot pain.     Chief Complaint: pain in both feet  Onset of  "problem: 18 days  Pain/ discomfort is described as:  Burning and deep ache  Pain Ratin-9/10 at worst   Frequency:  daily    The pain is exacerbated by working on her feet 6 hours as a   Previous treatment: ice, warm soaks  She specified plantar forefoot pain and plantar medial heel pain.    Past Medical History:   Diagnosis Date     NO ACTIVE PROBLEMS    *  *  Past Surgical History:   Procedure Laterality Date     BREAST SURGERY  2/15/2014    augmentation     DENTAL SURGERY     *  *  Current Outpatient Medications   Medication Sig Dispense Refill     naproxen (NAPROSYN) 500 MG tablet Take 1 tablet (500 mg) by mouth 2 times daily as needed for moderate pain 30 tablet 0       EXAM:    Vitals: /62   Ht 1.6 m (5' 3\")   Wt 48.4 kg (106 lb 12.8 oz)   LMP 2021 (Exact Date)   BMI 18.92 kg/m    BMI: Body mass index is 18.92 kg/m .    Constitutional:  Baljinder Perdomo is in no apparent distress, appears well-nourished.  Cooperative with history and physical exam.    Vascular:  Pedal pulses are palpable for both the DP and PT arteries.  CFT < 3 sec.  No edema.      Neuro: Light touch sensation is intact to the L4, L5, S1 distributions  No evidence of weakness, spasticity, or contracture in the lower extremities.     Derm: Normal texture and turgor.  No erythema, ecchymosis, or cyanosis.  No open lesions.     Musculoskeletal:    Lower extremity muscle strength is normal. No gross deformities.  Pain on palpation: Pain on palpation to the plantar medial aspect of the bilateral heel.  No significant pain with side-to-side compression of the heel.  No nodularity noted.  I palpated below the bilateral second metatarsal phalangeal joint.  This cause discomfort and she said that was the exact spot that is been causing pain.          Again, thank you for allowing me to participate in the care of your patient.        Sincerely,        Delonte Ellsworth, KIM    "

## 2021-03-03 NOTE — PATIENT INSTRUCTIONS
Thank you for choosing [facility]!    DR. MONTANO'S CLINIC LOCATIONS     [facility] SCHEDULE SURGERY: [phone]   [address] APPOINTMENTS: [phone]   [place] [address] BILLING QUESTIONS: [phone]  [phone] RADIOLOGY: [phone]       [place]    [address]    [place] [address]    [phone]  [phone]      Follow up: 1 month - if doing well can update clinic with phone call or via [employer] message.     Next steps: consider orthotics (see locations/suggestions below)        [place] to follow up with Primary Care provider regarding elevated blood pressure. (if equal or greater than 140/90)                                     Metatarsalgia                           Metatarsalgia is a condition marked by pain and inflammation in the ball of your foot.   You may experience metatarsalgia if you're physically active and you participate in activities that involve running and jumping. Or, you may develop metatarsalgia by wearing ill-fitting shoes. There are other causes as well.    Although generally not serious, metatarsalgia can sideline you. Fortunately, conservative treatments, such as ice and rest, can often relieve metatarsalgia symptoms. And proper footwear, along with shock-absorbing insoles or arch supports, may be all you need to prevent or minimize future problems with metatarsalgia.   Symptoms    Sharp, aching or burning pain in the ball of your foot -- the part of the sole just behind your   toes     Pain in the area around your second, third or fourth toes -- or, only near your big toe     Pain that gets worse when you stand, walk or run and improves when you rest     Sharp or shooting pain in your toes     Numbness or tingling in your toes     Pain that worsens when you flex your feet     A feeling in your feet as if you're walking with a pebble in your shoe     Increased pain when you're  walking barefoot, especially on a hard surface   Sometimes these symptoms develop suddenly -- especially if you've recently increased your usual amount of running, jumping or other high-impact exercise -- but problems usually develop over time.   When to see a doctor  Not all foot problems need medical care. Sometimes your feet simply ache after a long day of standing or a punishing workout. But it's best not to ignore any foot pain that lasts more than a few days. Talk to your doctor if you experience a burning pain in the ball of your foot that doesn't improve after changing your shoes and modifying your activities.     Causes  In each foot, five metatarsal bones run from your arch to your toe joints. The first metatarsal is shorter and thicker than the other four bones, which are usually similar in size. During the push-off phase when you walk, jump or run, your body weight is transferred to your toes and metatarsals. The first and second metatarsal bones take the brunt of this force. Most metatarsal problems develop when something changes in the way your foot normally works (mechanics), affecting how your weight is distributed. This can put excess pressure on the metatarsals, leading to inflammation and pain, especially in the metatarsal heads -- the rounded ends of the bones that connect with your toe bones.   Sometimes a single factor can lead to metatarsalgia. More often, several factors are involved, including:     Intense training or activity. Runners are at risk of metatarsalgia, primarily because the front of your foot absorbs significant force when you run. But anyone who participates in a high-impact sport is also at risk, especially if your shoes are ill-fitting or are worn out.     Certain foot shapes. A high arch can put extra pressure on the metatarsals. So can having a second toe that's longer than the big toe, which causes more weight than normal to be shifted to the second metatarsal head.      Hammertoe. This foot problem can develop when high heels or too-small shoes prevent your toes from lying flat. As a result, one of your toes -- usually the second -- curls downward because of a bend in the middle toe joint. This contraction depresses the metatarsal heads.     Bunion. This is a swollen, painful bump at the base of your big toe. Sometimes the tendency to develop bunions is inherited, but the problem can also result from wearing high heels or too-small shoes. Bunions are much more common in women than in men. A bunion can weaken your big toe, putting extra stress on the ball of your foot. Surgery to correct a bunion can also lead to metatarsalgia if you don't rest long enough for your foot to heal completely.     Excess weight. Because most of your body weight transfers to your forefoot when you move, extra pounds mean more pressure on your metatarsals. Losing weight may reduce or eliminate symptoms of metatarsalgia.     Poorly fitting shoes. High heels, which transfer extra weight to the front of your foot, are a common cause of metatarsalgia in women. Shoes with a narrow toe box or athletic shoes that lack support and padding also can contribute to metatarsal problems.     Stress fractures. Small breaks in the metatarsals or toe bones can be painful and change the way you put weight on your foot.     Cotto's neuroma. This noncancerous growth of fibrous tissue around a nerve usually occurs between the third and fourth metatarsal heads. It causes symptoms that are similar to metatarsalgia and can also contribute to metatarsal stress. Cotto's neuroma frequently results from wearing high heels or too-tight shoes that put pressure on your toes. It can also develop after high-impact activities such as jogging and aerobics.     What you can do  Make a list of the symptoms you're experiencing, including any that may seem unrelated to your foot pain. :   In the meantime  While you're waiting to see your  doctor, rest your foot as much as possible and wear properly fitting shoes. If your pain is severe, ibuprofen (Advil, Motrin, others), naproxen (Aleve) or aspirin may help ease your discomfort.   Treatments and drugs  Conservative measures usually relieve the pain of metatarsalgia.     Rest. Protect your foot from further injury by not stressing it. You may need to avoid your favorite sport for a while, but you can stay fit with low-impact exercises, such as swimming or cycling. Continue with stretching and lower body strength training as your pain permits.     Ice the affected area. Apply ice packs to the affected area for about 20 minutes at a time, several times a day. To protect your skin, wrap the ice packs in a thin towel.     Take an over-the-counter pain reliever. Try ibuprofen (Advil, Motrin, others), naproxen (Aleve, others) or aspirin to reduce pain and inflammation.     Wear proper shoes. Your doctor may recommend a shoe that's especially suited for your foot type, your stride and your particular sport.     Try shock-absorbing insoles. These off-the-shelf shoe inserts -- often made of cork, plastic, rubber or a gel-like substance -- fit inside your shoes to help cushion shock.     Use metatarsal pads. These off-the-shelf pads are placed in your shoes just ahead of the metatarsal bone to help deflect stress away from the painful area.     Consider arch supports. If insoles don't help, your doctor may recommend arch supports to minimize stress on the metatarsal bones and improve foot function. Off-the-shelf arch supports come in various sizes and can be fitted immediately. More durable arch supports can be custom-made from a foam mold or plaster cast of your foot.   Rigid arch supports are made of a firm material such as plastic or carbon fiber. They're designed to control motion in two major foot joints below your ankles. Semirigid arch supports are made of softer materials such as leather and cork  reinforced by silicone. Arch supports designed to treat metatarsalgia may include metatarsal pads, too.   If conservative treatments fail, in rare cases surgery to realign the metatarsal bones may be an option. If you're considering foot surgery, discuss the benefits and risks with your doctor.   Prevention  When you're serious about sports, your metatarsals may take a beating. But that doesn't mean you have to live with pain and injuries. To help protect your feet:     Choose the right shoes. Wearing high heels or too-small shoes can set the stage for a host of foot problems, including metatarsalgia. The same is true of shoes that don't provide enough support and cushioning. Look for shoes with a wide toe box and a rocker sole, which redistribute weight on the bottom of your foot.     Consider cushioned insoles or arch supports. These products can help prevent the pain of metatarsalgia -- as well as relieve the pain when it develops.     Maintain a healthy weight. Keeping your weight within a healthy range can take a load off your feet.   If you're recovering from an injury, don't try to resume strenuous activity too soon. If you're not completely healed -- or you train through the pain -- you may develop more severe problems that limit your activity even longer.   PLANTAR FASCIITIS  Plantar fasciitis is often referred to as heel spurs or heel pain. Plantar fasciitis is a very common problem that affects people of all foot shapes, age, weight and activity level. Pain may be in the arch or on the weight-bearing surface of the heel. The pain may come on without injury or identifiable cause. Pain is generally present when first getting out of bed in the morning or up from a seated break.     CAUSES  The plantar fascia is a dense fibrous band of tissue that stretches across the bottom surface of the foot. The fascia helps support the foot muscles and arch. Plantar fasciitis is thought to be caused by mechanical strain  or overload. Frequent walking without shoes or wearing unsupportive shoes is thought to cause structural overload and ultimately inflammation of the plantar fascia. Some people have heel spurs that can be seen on x-ray. The heel spur is actually a minor component of plantar fascitis and is largely ignored.       SELF TREATMENT   The easiest solution is to stop walking around your home without shoes. Plantar fasciitis is largely a shoe problem. Shoes are either not being worn often enough or your current shoes are inadequate for your weight, foot structure or activity level. The majority of shoes on the market today are not sufficient to resist development of plantar fasciitis or to promote healing. Assume that your current shoes are inadequate and will need to be replaced. Even high quality shoes wear out with 6 months to one year of frequent use. Weight loss is another option. Losing ten pounds in the next two months may be enough to resolve the problem. Ice applied to the area of pain two to three times per day for ten minutes each session can be very helpful. Warm foot soaks in epsom salts can also relieve pain. This should continue until the problem resolves. Achilles tendon stretching is essential. Stretch multiple times daily to promote healing and to prevent recurrence in the future. Over all stretching of the body is helpful as well such as the calves, thighs and lower back. Normally when one area of the body is tight, other areas are too. Gentle Yoga can be good for this.     Over the counter topical anti inflammatories can be helpful such as biofreeze, bengay, salon pas, ect...  Oral ibuprofen or aleve is recommended as well to try to calm down inflammation.     Night splints can be helpful to gradually stretch the foot at night as a lot of pain is when you get up in the morning. Taking a towel or thera band and stretching the foot back multiple times before you get ou of bed can be beneficial as well.      MEDICAL TREATMENT  Medical treatments often include custom arch supports, cortisone injections, physical therapy, splints to be worn in bed, prescription medications and surgery. The home treatments listed above will be necessary regardless of these advanced medical treatments. Surgery is rarely needed but is very helpful in selected cases.     PROGNOSIS  Plantar fasciitis can last from one day to a lifetime. Some people get intermittent fascitis that is very short-lived. Others suffer daily for years. Excessive body weight, frequent bare foot walking, long hours on the feet, inadequate shoes, predisposing foot structures and excessive activity such as running are all potential issues that lead to chronic and/or recurring plantar fascitis. Having plantar fasciitis means that you are forever prone to this problem and will require modification of some of the above factors. Most people seek treatment within one to four months. Healing usually requires a similar one to four month time frame. Healing time is relative to the amount of effort spent treating the problem.   Plantar fasciitis is highly recurrent. Risk factors often continue, including return to bare foot walking, inadequate shoes, excessive body weight, excessive activities, etc. Your life style and foot structure may predispose you to recurrent plantar fasciitis. A daily prevention regimen can be very helpful. Ongoing use of shoe inserts, careful attention to appropriate shoes, daily Achilles stretching, etc. may prevent recurrence. Prompt attention at the earliest warning signs of heel pain can resolve the problem in as short as a few days.     EXERCISES  Stair Exercise: Step on the stairs with the ball of your foot and hold your position for at least 15 seconds, then slowly step down with the heels of your foot. You can do this daily and as often as you want.   Picking the Towel: Sit comfortably and then pick the towel up with your toes. You can use  any object other than a towel as long as the material can be soft and you can pick it up with your toes.  Rolling the Bottle: Use a small ball or frozen water bottle and then roll it around with your foot.   Flex the Toes: Sit comfortably and then flex your toes by pointing it towards the floor or towards your body. This will relax and flex your foot and exercise your plantar fascia, the calf, and the Achilles tendon. The inability of the foot to stretch often causes the bunching up of the plantar fascia area leading to the pain.  Calf/Achilles Stretching: Lay on you back and raise one foot, then point your toes towards the floor. See photo below:               Hold each stretch for 10 seconds. Stretch 10 times per set, three sets per day. Morning, afternoon and evening. If your heel pain is very severe in the morning, consider doing the first set of stretches before you get out of bed.      OVER THE COUNTER INSERT RECOMMENDATIONS  SuperFeet   Sofsole Fit Spenco   Power Step   Walk-Fit Arch Cradles     Most of these can be found at your local [employer], sporting [employer] stores, or online.  **A good high quality over the counter insert should cost around $40-$50      [facility] LOCATIONS  [place]  [address] [place] [address]  [phone]   [address]  [phone] [place]  [address] [place] [address]  [phone]   [place]  [address] [place] [address] [place]  [phone]   [place]  [address] [facility]  [phone] [place] [address]  [phone]   [place]  [address]  [phone]   [place]  [address]  [phone] [place]  [address]  [phone]       [facility] LOCATIONS  [facility]  [address]  [place] [address]  Phone: [phone]  Fax: [phone] [facility]  [address]  [place] [address]  Phone: [phone]   Fax: [phone]   [facility]  [address]  [place] [address]  Phone: [phone]  Fax: [phone] [facility]  [address]  [place] [address]  Phone: [phone]   Fax: [phone]   [facility]   [address]  [place] [address]  Phone: [phone]  Fax: [phone] * Please call any location listed to make an appointment for a casting/fitting. Your referral was sent to their central office and they will all have the order on file.

## 2021-03-22 ENCOUNTER — VIRTUAL VISIT (OUTPATIENT)
Dept: INTERNAL MEDICINE | Facility: CLINIC | Age: 31
End: 2021-03-22
Payer: COMMERCIAL

## 2021-03-22 DIAGNOSIS — K52.9 GASTROENTERITIS: ICD-10-CM

## 2021-03-22 DIAGNOSIS — K52.9 GASTROENTERITIS: Primary | ICD-10-CM

## 2021-03-22 PROCEDURE — U0005 INFEC AGEN DETEC AMPLI PROBE: HCPCS | Performed by: NURSE PRACTITIONER

## 2021-03-22 PROCEDURE — U0003 INFECTIOUS AGENT DETECTION BY NUCLEIC ACID (DNA OR RNA); SEVERE ACUTE RESPIRATORY SYNDROME CORONAVIRUS 2 (SARS-COV-2) (CORONAVIRUS DISEASE [COVID-19]), AMPLIFIED PROBE TECHNIQUE, MAKING USE OF HIGH THROUGHPUT TECHNOLOGIES AS DESCRIBED BY CMS-2020-01-R: HCPCS | Performed by: NURSE PRACTITIONER

## 2021-03-22 PROCEDURE — 99213 OFFICE O/P EST LOW 20 MIN: CPT | Mod: 95 | Performed by: NURSE PRACTITIONER

## 2021-03-22 RX ORDER — METHOCARBAMOL 500 MG/1
500 TABLET, FILM COATED ORAL 4 TIMES DAILY PRN
COMMUNITY
End: 2023-01-10

## 2021-03-22 ASSESSMENT — PAIN SCALES - GENERAL: PAINLEVEL: NO PAIN (0)

## 2021-03-22 NOTE — PROGRESS NOTES
Baljinder is a 30 year old who is being evaluated via a billable telephone visit.      What phone number would you like to be contacted at? [phone]  How would you like to obtain your AVS? MyChart    Assessment & Plan     Gastroenteritis    - Symptomatic COVID-19 Virus (Coronavirus) by PCR; Future    Push fluids, monitor sx               Kelsea Simons NP  [facility]    Marshall Gale is a 30 year old who presents for the following health issues     HPI     Concern - vomiting  Onset: 1 day  Description: n/v x 1 day, afebrile.  Headache. No URI, taste/smell changes  Intensity: moderate  Progression of Symptoms:  same  Accompanying Signs & Symptoms: none  Previous history of similar problem: none  Precipitating factors:        Worsened by: none  Alleviating factors:        Improved by: none  Therapies tried and outcome:  none         Review of Systems   Constitutional, HEENT, cardiovascular, pulmonary, gi and gu systems are negative, except as otherwise noted.      Objective           Vitals:  No vitals were obtained today due to virtual visit.    Physical Exam     PSYCH: Alert and oriented times 3; coherent speech, normal   rate and volume, able to articulate logical thoughts, able   to abstract reason, no tangential thoughts, no hallucinations   or delusions  Her affect is normal  RESP: No cough, no audible wheezing, able to talk in full sentences  Remainder of exam unable to be completed due to telephone visits                Phone call duration: 12 minutes

## 2021-03-23 LAB
LABORATORY COMMENT REPORT: NORMAL
SARS-COV-2 RNA RESP QL NAA+PROBE: NEGATIVE
SARS-COV-2 RNA RESP QL NAA+PROBE: NORMAL
SPECIMEN SOURCE: NORMAL
SPECIMEN SOURCE: NORMAL

## 2021-04-18 ENCOUNTER — HEALTH MAINTENANCE LETTER (OUTPATIENT)
Age: 31
End: 2021-04-18

## 2021-06-15 ENCOUNTER — OFFICE VISIT (OUTPATIENT)
Dept: INTERNAL MEDICINE | Facility: CLINIC | Age: 31
End: 2021-06-15
Payer: COMMERCIAL

## 2021-06-15 VITALS
HEART RATE: 94 BPM | OXYGEN SATURATION: 100 % | WEIGHT: 105.9 LBS | SYSTOLIC BLOOD PRESSURE: 104 MMHG | HEIGHT: 63 IN | DIASTOLIC BLOOD PRESSURE: 71 MMHG | BODY MASS INDEX: 18.77 KG/M2 | TEMPERATURE: 98.3 F | RESPIRATION RATE: 16 BRPM

## 2021-06-15 DIAGNOSIS — M79.671 PAIN IN BOTH FEET: Primary | ICD-10-CM

## 2021-06-15 DIAGNOSIS — R20.2 PARESTHESIAS: ICD-10-CM

## 2021-06-15 DIAGNOSIS — Z00.00 ENCOUNTER FOR PREVENTATIVE ADULT HEALTH CARE EXAMINATION: ICD-10-CM

## 2021-06-15 DIAGNOSIS — M79.672 PAIN IN BOTH FEET: Primary | ICD-10-CM

## 2021-06-15 LAB
ALBUMIN SERPL-MCNC: 4.1 G/DL (ref 3.4–5)
ALP SERPL-CCNC: 54 U/L (ref 40–150)
ALT SERPL W P-5'-P-CCNC: 18 U/L (ref 0–50)
ANION GAP SERPL CALCULATED.3IONS-SCNC: 2 MMOL/L (ref 3–14)
AST SERPL W P-5'-P-CCNC: 12 U/L (ref 0–45)
BILIRUB SERPL-MCNC: 0.5 MG/DL (ref 0.2–1.3)
BUN SERPL-MCNC: 19 MG/DL (ref 7–30)
CALCIUM SERPL-MCNC: 9 MG/DL (ref 8.5–10.1)
CHLORIDE SERPL-SCNC: 110 MMOL/L (ref 94–109)
CO2 SERPL-SCNC: 27 MMOL/L (ref 20–32)
CREAT SERPL-MCNC: 0.78 MG/DL (ref 0.52–1.04)
CRP SERPL-MCNC: <2.9 MG/L (ref 0–8)
ERYTHROCYTE [DISTWIDTH] IN BLOOD BY AUTOMATED COUNT: 12.2 % (ref 10–15)
ERYTHROCYTE [SEDIMENTATION RATE] IN BLOOD BY WESTERGREN METHOD: 7 MM/H (ref 0–20)
GFR SERPL CREATININE-BSD FRML MDRD: >90 ML/MIN/{1.73_M2}
GLUCOSE SERPL-MCNC: 101 MG/DL (ref 70–99)
HBA1C MFR BLD: 4.9 % (ref 0–5.6)
HCT VFR BLD AUTO: 40.7 % (ref 35–47)
HGB BLD-MCNC: 13 G/DL (ref 11.7–15.7)
MCH RBC QN AUTO: 31.3 PG (ref 26.5–33)
MCHC RBC AUTO-ENTMCNC: 31.9 G/DL (ref 31.5–36.5)
MCV RBC AUTO: 98 FL (ref 78–100)
PLATELET # BLD AUTO: 206 10E9/L (ref 150–450)
POTASSIUM SERPL-SCNC: 3.9 MMOL/L (ref 3.4–5.3)
PROT SERPL-MCNC: 7.6 G/DL (ref 6.8–8.8)
RBC # BLD AUTO: 4.16 10E12/L (ref 3.8–5.2)
SODIUM SERPL-SCNC: 139 MMOL/L (ref 133–144)
TSH SERPL DL<=0.005 MIU/L-ACNC: 1.37 MU/L (ref 0.4–4)
VIT B12 SERPL-MCNC: 283 PG/ML (ref 193–986)
WBC # BLD AUTO: 4.2 10E9/L (ref 4–11)

## 2021-06-15 PROCEDURE — 85652 RBC SED RATE AUTOMATED: CPT | Performed by: INTERNAL MEDICINE

## 2021-06-15 PROCEDURE — 86803 HEPATITIS C AB TEST: CPT | Performed by: INTERNAL MEDICINE

## 2021-06-15 PROCEDURE — 80053 COMPREHEN METABOLIC PANEL: CPT | Performed by: INTERNAL MEDICINE

## 2021-06-15 PROCEDURE — 36415 COLL VENOUS BLD VENIPUNCTURE: CPT | Performed by: INTERNAL MEDICINE

## 2021-06-15 PROCEDURE — 86140 C-REACTIVE PROTEIN: CPT | Performed by: INTERNAL MEDICINE

## 2021-06-15 PROCEDURE — 85027 COMPLETE CBC AUTOMATED: CPT | Performed by: INTERNAL MEDICINE

## 2021-06-15 PROCEDURE — 99214 OFFICE O/P EST MOD 30 MIN: CPT | Performed by: INTERNAL MEDICINE

## 2021-06-15 PROCEDURE — 83036 HEMOGLOBIN GLYCOSYLATED A1C: CPT | Performed by: INTERNAL MEDICINE

## 2021-06-15 PROCEDURE — 84443 ASSAY THYROID STIM HORMONE: CPT | Performed by: INTERNAL MEDICINE

## 2021-06-15 PROCEDURE — 82306 VITAMIN D 25 HYDROXY: CPT | Performed by: INTERNAL MEDICINE

## 2021-06-15 PROCEDURE — 82607 VITAMIN B-12: CPT | Performed by: INTERNAL MEDICINE

## 2021-06-15 RX ORDER — CYCLOBENZAPRINE HCL 5 MG
5 TABLET ORAL 3 TIMES DAILY PRN
COMMUNITY
Start: 2021-06-15 | End: 2021-09-08

## 2021-06-15 ASSESSMENT — MIFFLIN-ST. JEOR: SCORE: 1164.49

## 2021-06-15 NOTE — PROGRESS NOTES
Assessment & Plan     Pain in both feet  Assess lab work   - CBC with platelets  - Comprehensive metabolic panel  - TSH with free T4 reflex  - Hemoglobin A1c  - Vitamin D Deficiency  - Vitamin B12  - Erythrocyte sedimentation rate auto  - CRP, inflammation    Paresthesias  Assess lab   - CBC with platelets  - Comprehensive metabolic panel  - TSH with free T4 reflex  - Hemoglobin A1c  - Vitamin D Deficiency  - Vitamin B12  - Erythrocyte sedimentation rate auto  - CRP, inflammation    Encounter for preventative adult health care examination    - Hepatitis C antibody             See Patient Instructions    Return in about 3 months (around 9/15/2021) for Routine Visit.    Edgar Berry MD  [facility] LORRIE Gale is a 31 year old who presents for the following health issues     HPI     Follow up bilateral foot pain since 1--2021.    Presents with feet pain for 6 months.   Worse with standing and walking. Pain is in the fall of the feet, heels and great toe.   Pain is excruciating. Uses foot inserts. Helps but not sufficient. No pain at rest or at night.   Feels like burning.   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   Seen orthotic doctor and chiropractor.   Symptoms not improved. Unable to do her normal activities, because of pain with walking.         Review of Systems   Constitutional, HEENT, cardiovascular, pulmonary, gi and gu systems are negative, except as otherwise noted.      Objective    There were no vitals taken for this visit.  There is no height or weight on file to calculate BMI.  Physical Exam   GENERAL: healthy, alert and no distress  NECK: no adenopathy, no asymmetry, masses, or scars and thyroid normal to palpation  RESP: lungs clear to auscultation - no rales, rhonchi or wheezes  CV: regular rate and rhythm, normal S1 S2, no S3 or S4, no murmur, click or rub, no peripheral edema and peripheral pulses strong  ABDOMEN:  soft, nontender, no hepatosplenomegaly, no masses and bowel sounds normal  MS: no gross musculoskeletal defects noted, no edema  SKIN: no suspicious lesions or rashes  NEURO: Normal strength and tone, mentation intact and speech normal    Orders Only on 03/22/2021   Component Date Value Ref Range Status     COVID-19 Virus PCR to [facility] - So* 03/22/2021 Nasopharyngeal   Final     COVID-19 Virus PCR to [facility] - Re* 03/22/2021 Test received-See reflex to [facility] test SARS CoV2 (COVID-19) Virus RT-PCR   Final     SARS-CoV-2 Virus Specimen Source 03/22/2021 Nasopharyngeal   Final     SARS-CoV-2 PCR Result 03/22/2021 NEGATIVE   Final    SARS-CoV2 (COVID-19) RNA not detected, presumed negative.     SARS-CoV-2 PCR Comment 03/22/2021    Final                    Value:Testing was performed using the Aptima SARS-CoV-2 Assay on the [employer] Instrument System.   Additional information about this Emergency Use Authorization (EUA) assay can be found via   the Lab Guide.      Comment: This test should be ordered for the detection of SARS-CoV-2 in individuals who   meet SARS-CoV-2 clinical and/or epidemiological criteria. Test performance is   unknown in asymptomatic patients.  This test is for in vitro diagnostic use under the FDA EUA for laboratories   certified under CLIA to perform high complexity testing. This test has not   been FDA cleared or approved.  A negative result does not rule out the presence of PCR inhibitors in the   specimen or target RNA in concentration below the limit of detection for the   assay. The possibility of a false negative should be considered if the   patient's recent exposure or clinical presentation suggests COVID-19.  This test was validated by the [facility] Infectious Diseases   Diagnostic Laboratory. This laboratory is certified under the Clinical   Laboratory Improvement Amendments of 1988 (CLIA-88) as qualified to perform   high complexity laboratory testing.

## 2021-06-15 NOTE — LETTER
June 17, 2021      Baljinder MARCELLUS WeberPerdomo  [address] [facility]  [address] [place], [place] [id number]        Dear ,    We are writing to inform you of your test results.    {results letter list:[id number]}    Resulted Orders   CBC with platelets   Result Value Ref Range    WBC 4.2 4.0 - 11.0 10e9/L    RBC Count 4.16 3.8 - 5.2 10e12/L    Hemoglobin 13.0 11.7 - 15.7 g/dL    Hematocrit 40.7 35.0 - 47.0 %    MCV 98 78 - 100 fl    MCH 31.3 26.5 - 33.0 pg    MCHC 31.9 31.5 - 36.5 g/dL    RDW 12.2 10.0 - 15.0 %    Platelet Count 206 150 - 450 10e9/L   Comprehensive metabolic panel   Result Value Ref Range    Sodium 139 133 - 144 mmol/L    Potassium 3.9 3.4 - 5.3 mmol/L    Chloride 110 (H) 94 - 109 mmol/L    Carbon Dioxide 27 20 - 32 mmol/L    Anion Gap 2 (L) 3 - 14 mmol/L    Glucose 101 (H) 70 - 99 mg/dL      Comment:      Fasting specimen    Urea Nitrogen 19 7 - 30 mg/dL    Creatinine 0.78 0.52 - 1.04 mg/dL    GFR Estimate >90 >60 mL/min/[1.73_m2]      Comment:      Non  GFR Calc  Starting 12/18/2018, serum creatinine based estimated GFR (eGFR) will be   calculated using the Chronic Kidney Disease Epidemiology Collaboration   (CKD-EPI) equation.      GFR Estimate If Black >90 >60 mL/min/[1.73_m2]      Comment:       GFR Calc  Starting 12/18/2018, serum creatinine based estimated GFR (eGFR) will be   calculated using the Chronic Kidney Disease Epidemiology Collaboration   (CKD-EPI) equation.      Calcium 9.0 8.5 - 10.1 mg/dL    Bilirubin Total 0.5 0.2 - 1.3 mg/dL    Albumin 4.1 3.4 - 5.0 g/dL    Protein Total 7.6 6.8 - 8.8 g/dL    Alkaline Phosphatase 54 40 - 150 U/L    ALT 18 0 - 50 U/L    AST 12 0 - 45 U/L   TSH with free T4 reflex   Result Value Ref Range    TSH 1.37 0.40 - 4.00 mU/L   Hemoglobin A1c   Result Value Ref Range    Hemoglobin A1C 4.9 0 - 5.6 %      Comment:      Normal <5.7% Prediabetes 5.7-6.4%  Diabetes 6.5% or higher - adopted from ADA   consensus guidelines.     Vitamin D  Deficiency   Result Value Ref Range    Vitamin D Deficiency screening 17 (L) 20 - 75 ug/L      Comment:      Season, race, dietary intake, and treatment affect the concentration of   25-hydroxy-Vitamin D. Values may decrease during winter months and increase   during summer months. Values 20-29 ug/L may indicate Vitamin D insufficiency   and values <20 ug/L may indicate Vitamin D deficiency.  Vitamin D determination is routinely performed by an immunoassay specific for   25 hydroxyvitamin D3.  If an individual is on vitamin D2 (ergocalciferol)   supplementation, please specify 25 [place] vitamin D2 and D3 level determination by   LCMSMS test [id number].     Vitamin B12   Result Value Ref Range    Vitamin B12 283 193 - 986 pg/mL   Erythrocyte sedimentation rate auto   Result Value Ref Range    Sed Rate 7 0 - 20 mm/h   CRP, inflammation   Result Value Ref Range    CRP Inflammation <2.9 0.0 - 8.0 mg/L   Hepatitis C antibody   Result Value Ref Range    Hepatitis C Antibody Nonreactive NR^Nonreactive      Comment:      Assay performance characteristics have not been established for newborns,   infants, and children         If you have any questions or concerns, please call the clinic at the number listed above.       Sincerely,      Edgar Berry MD

## 2021-06-15 NOTE — LETTER
[place] [address]  [facility] [address]  [phone]          Courtney 15, 2021    RE:  Baljinder Perdomo                                                                                                                                                       [address] [address] [address]            To whom it may concern:    Baljinder Perdomo is under my professional care for    Pain in both feet  Paresthesias     I recommend the  Patient to be able to take breaks and to have reduced hours to 20 hours per week, because of the severity of her symptoms.     Sincerely,        Edgar Berry MD

## 2021-06-16 LAB
DEPRECATED CALCIDIOL+CALCIFEROL SERPL-MC: 17 UG/L (ref 20–75)
HCV AB SERPL QL IA: NONREACTIVE

## 2021-09-01 ENCOUNTER — MYC REFILL (OUTPATIENT)
Dept: INTERNAL MEDICINE | Facility: CLINIC | Age: 31
End: 2021-09-01

## 2021-09-01 DIAGNOSIS — M79.673 ARCH PAIN, UNSPECIFIED LATERALITY: ICD-10-CM

## 2021-09-02 RX ORDER — NAPROXEN 500 MG/1
500 TABLET ORAL 2 TIMES DAILY PRN
Qty: 30 TABLET | Refills: 3 | Status: SHIPPED | OUTPATIENT
Start: 2021-09-02 | End: 2023-01-10

## 2021-09-08 DIAGNOSIS — M79.671 PAIN IN BOTH FEET: Primary | ICD-10-CM

## 2021-09-08 DIAGNOSIS — M79.672 PAIN IN BOTH FEET: Primary | ICD-10-CM

## 2021-09-08 RX ORDER — CYCLOBENZAPRINE HCL 5 MG
5 TABLET ORAL 3 TIMES DAILY PRN
Qty: 90 TABLET | Refills: 0 | Status: SHIPPED | OUTPATIENT
Start: 2021-09-08 | End: 2023-01-10

## 2021-09-08 NOTE — TELEPHONE ENCOUNTER
Pending Prescriptions:                       Disp   Refills    cyclobenzaprine (FLEXERIL) 5 MG tablet    90 tab*0            Sig: Take 1 tablet (5 mg) by mouth 3 times daily as           needed for muscle spasms    Routing refill request to provider for review/approval because:  Drug not on the G refill protocol

## 2021-10-02 ENCOUNTER — HEALTH MAINTENANCE LETTER (OUTPATIENT)
Age: 31
End: 2021-10-02

## 2022-01-06 ENCOUNTER — OFFICE VISIT (OUTPATIENT)
Dept: OBGYN | Facility: CLINIC | Age: 32
End: 2022-01-06
Payer: COMMERCIAL

## 2022-01-06 VITALS
HEART RATE: 68 BPM | HEIGHT: 63 IN | DIASTOLIC BLOOD PRESSURE: 62 MMHG | SYSTOLIC BLOOD PRESSURE: 110 MMHG | WEIGHT: 105 LBS | BODY MASS INDEX: 18.61 KG/M2

## 2022-01-06 DIAGNOSIS — N76.0 BV (BACTERIAL VAGINOSIS): ICD-10-CM

## 2022-01-06 DIAGNOSIS — N93.9 VAGINAL SPOTTING: ICD-10-CM

## 2022-01-06 DIAGNOSIS — B96.89 BV (BACTERIAL VAGINOSIS): ICD-10-CM

## 2022-01-06 DIAGNOSIS — N94.6: ICD-10-CM

## 2022-01-06 DIAGNOSIS — Z11.3 SCREEN FOR STD (SEXUALLY TRANSMITTED DISEASE): Primary | ICD-10-CM

## 2022-01-06 LAB
CLUE CELLS: PRESENT
HCG IFA URINE: NEGATIVE
TRICHOMONAS, WET PREP: ABNORMAL
WBC'S/HIGH POWER FIELD, WET PREP: ABNORMAL
YEAST, WET PREP: ABNORMAL

## 2022-01-06 PROCEDURE — 87210 SMEAR WET MOUNT SALINE/INK: CPT | Performed by: OBSTETRICS & GYNECOLOGY

## 2022-01-06 PROCEDURE — 86780 TREPONEMA PALLIDUM: CPT | Performed by: OBSTETRICS & GYNECOLOGY

## 2022-01-06 PROCEDURE — 87491 CHLMYD TRACH DNA AMP PROBE: CPT | Performed by: OBSTETRICS & GYNECOLOGY

## 2022-01-06 PROCEDURE — 86803 HEPATITIS C AB TEST: CPT | Performed by: OBSTETRICS & GYNECOLOGY

## 2022-01-06 PROCEDURE — 99203 OFFICE O/P NEW LOW 30 MIN: CPT | Performed by: OBSTETRICS & GYNECOLOGY

## 2022-01-06 PROCEDURE — 87591 N.GONORRHOEAE DNA AMP PROB: CPT | Performed by: OBSTETRICS & GYNECOLOGY

## 2022-01-06 PROCEDURE — 36415 COLL VENOUS BLD VENIPUNCTURE: CPT | Performed by: OBSTETRICS & GYNECOLOGY

## 2022-01-06 PROCEDURE — 84703 CHORIONIC GONADOTROPIN ASSAY: CPT | Performed by: OBSTETRICS & GYNECOLOGY

## 2022-01-06 PROCEDURE — 87389 HIV-1 AG W/HIV-1&-2 AB AG IA: CPT | Performed by: OBSTETRICS & GYNECOLOGY

## 2022-01-06 PROCEDURE — 87340 HEPATITIS B SURFACE AG IA: CPT | Performed by: OBSTETRICS & GYNECOLOGY

## 2022-01-06 ASSESSMENT — MIFFLIN-ST. JEOR: SCORE: 1160.41

## 2022-01-06 NOTE — NURSING NOTE
"Chief Complaint   Patient presents with     Pelvic Pain       Initial /62   Pulse 68   Ht 1.6 m (5' 3\")   Wt 47.6 kg (105 lb)   LMP 2021   Breastfeeding No   BMI 18.60 kg/m   Estimated body mass index is 18.6 kg/m  as calculated from the following:    Height as of this encounter: 1.6 m (5' 3\").    Weight as of this encounter: 47.6 kg (105 lb).  BP completed using cuff size: regular    Questioned patient about current smoking habits.  Pt. has never smoked.          The following HM Due: NONE      The following patient reported/Care Every where data was sent to:  P ABSTRACT QUALITY INITIATIVES [[id number]]  Lesa Nunez LPN               "

## 2022-01-06 NOTE — PROGRESS NOTES
"  SUBJECTIVE:                                                     Baljinder Perdomo is a 31 year old female  who presents to clinic today for recent pain with her period.    LMP , one day of normal bleeding and then just spotting. Painful cramps for 2 days, which she \"never has\". She is concerned about an STD. She has been sexually active with 2 partners in the last month, only had vaginal intercourse with one. She has not used birth control or condoms. She would be interested in conceiving with her partner, is taking a vitamin. Would like full STD screening, and a pregnancy test.    Denies fever or chills, nausea, vomiting, or diarrhea, urinary symptoms. No vaginal discharge, irritation, burning.      Problem list and histories reviewed & adjusted, as indicated.  Additional history: as documented.    Patient Active Problem List   Diagnosis     Encounter for supervision of other normal pregnancy     Rh negative status during pregnancy     Blood type B-     Indication for care in labor or delivery     Encounter for triage in pregnant patient      (spontaneous vaginal delivery)     Past Surgical History:   Procedure Laterality Date     BREAST SURGERY  2/15/2014    augmentation     DENTAL SURGERY        Social History     Tobacco Use     Smoking status: Never Smoker     Smokeless tobacco: Never Used   Substance Use Topics     Alcohol use: No     Alcohol/week: 0.0 standard drinks      Problem (# of Occurrences) Relation (Name,Age of Onset)    Family History Negative (2) Mother, Father            naproxen (NAPROSYN) 500 MG tablet, Take 1 tablet (500 mg) by mouth 2 times daily as needed for moderate pain  cyclobenzaprine (FLEXERIL) 5 MG tablet, Take 1 tablet (5 mg) by mouth 3 times daily as needed for muscle spasms (Patient not taking: Reported on 2022)  methocarbamol (ROBAXIN) 500 MG tablet, Take 500 mg by mouth 4 times daily as needed for muscle spasms (Patient not taking: Reported on " 1/6/2022)    No current facility-administered medications on file prior to visit.    No Known Allergies    ROS:  Negative other than as noted in HPI.    OBJECTIVE:     Exam:  Constitutional:  Appearance: Well nourished, well developed alert, in no acute distress  Chest:  Respiratory Effort:  Breathing unlabored  Skin:General Inspection:  No rashes present, no lesions present, no areas of discoloration  Neurologic/Psychiatric:  Mental Status:  Oriented X3   Pelvic Exam:  External Genitalia:     Normal appearance for age, no discharge present, no tenderness present, no inflammatory lesions present, color normal  Vagina:     Normal vaginal vault without central or paravaginal defects, no discharge present, no inflammatory lesions present, no masses present  Bladder:     Nontender to palpation  Urethra:   Urethral Body:  Urethra palpation normal, urethra structural support normal   Urethral Meatus:  No erythema or lesions present  Cervix:     Appearance healthy,  Round, polypoid lesion at 5 o clock--she states this polyp has been present for a long time, nontender to palpation, no bleeding present  Perineum:     Perineum within normal limits, no evidence of trauma, no rashes or skin lesions present  Anus:     Anus within normal limits, no hemorrhoids present  Inguinal Lymph Nodes:     No lymphadenopathy present  Pubic Hair:     Normal pubic hair distribution for age  Genitalia and Groin:     No rashes present, no lesions present, no areas of discoloration, no masses present        In-Clinic Test Results:  No results found for this or any previous visit (from the past 24 hour(s)).    ASSESSMENT/PLAN:                                                        ICD-10-CM    1. Screen for STD (sexually transmitted disease)  Z11.3 Hepatitis C antibody     HIV Antigen Antibody Combo     Hepatitis B surface antigen     Chlamydia trachomatis/Neisseria gonorrhoeae by PCR     Wet preparation     Treponema Abs w Reflex to RPR and Titer    2. Period pain present  N94.6          -Check urine pregnancy test.  -Screen for STDs. Discussed safe sex, taking a prenatal if she wishes to conceive.  -Mostly she is just worried that she does not have an infection, so if all testing is negative, will just watch to see how her next period is. We discussed how to initiate prenatal care if she does conceive.    Nicolasa Davidson MD  [facility] [place]

## 2022-01-07 LAB
C TRACH DNA SPEC QL NAA+PROBE: NEGATIVE
HBV SURFACE AG SERPL QL IA: NONREACTIVE
HCV AB SERPL QL IA: NONREACTIVE
HIV 1+2 AB+HIV1 P24 AG SERPL QL IA: NONREACTIVE
N GONORRHOEA DNA SPEC QL NAA+PROBE: NEGATIVE
T PALLIDUM AB SER QL: NONREACTIVE

## 2022-01-07 RX ORDER — METRONIDAZOLE 500 MG/1
500 TABLET ORAL 2 TIMES DAILY
Qty: 14 TABLET | Refills: 0 | Status: SHIPPED | OUTPATIENT
Start: 2022-01-07 | End: 2022-01-07

## 2022-01-13 ENCOUNTER — E-VISIT (OUTPATIENT)
Dept: INTERNAL MEDICINE | Facility: CLINIC | Age: 32
End: 2022-01-13
Payer: COMMERCIAL

## 2022-01-13 DIAGNOSIS — R29.898 ARM WEAKNESS: Primary | ICD-10-CM

## 2022-01-13 PROCEDURE — 99207 PR NON-BILLABLE SERV PER CHARTING: CPT | Performed by: INTERNAL MEDICINE

## 2022-01-14 NOTE — TELEPHONE ENCOUNTER
Patient has not yet read message from Dr. Chaidez.     Call to patient. Patient informed of Dr. Chaidez's response from e-visit. Patient verbalized understanding and denies questions/concerns.     Krystal JONES RN   [facility]

## 2022-05-14 ENCOUNTER — HEALTH MAINTENANCE LETTER (OUTPATIENT)
Age: 32
End: 2022-05-14

## 2022-09-03 ENCOUNTER — HEALTH MAINTENANCE LETTER (OUTPATIENT)
Age: 32
End: 2022-09-03

## 2023-01-09 NOTE — PATIENT INSTRUCTIONS

## 2023-01-09 NOTE — PROGRESS NOTES
Progress Note    SUBJECTIVE:  Baljinder Perdomo is an 32 year old, , who requests an Annual Preventive Exam.     Concerns today include:     STI screen. Recent chlamydia diagnosis. Completed 1 week doxy treatment 2.5-3 weeks ago. Discussed recommendation for retest at 3 months, but patient prefers test of cure because she threw up medication. She continues to feel some sharp twinges of adnexal pain, alyssa on right.     Has had one male partner for 2-3 years, new partner just prior to diagnosis. Feels she had symptoms and was tested soon after transmission.     Pt denies emotional and physical abuse. No other safety concerns.     Eating well. Loves salad and vegetables. Has several servings of dairy per day.  C/o losing weight easily. If sick will get down to 90lbs within a couple days.     Mental health feels stable, but does report some anxiety. PHQ = 0, MAINOR = 5.     Menstrual History:  Menstrual History 6/15/2021 2022 1/10/2023   LAST MENSTRUAL PERIOD 2021       Last    Lab Results   Component Value Date    PAP NORMAL 2015     History of abnormal Pap smear: NO - age 30-65 PAP every 5 years with negative HPV co-testing recommended    Mammogram current: not applicable. Does not know maternal family history because her mom left home at a young age.     Last Colonoscopy: not applicable.      HISTORY:  No current outpatient medications on file prior to visit.  No current facility-administered medications on file prior to visit.    Allergies   Allergen Reactions     Metronidazole Hives     Possible.  Stopped taking 1 week ago.     Immunization History   Administered Date(s) Administered     HPV Quadrivalent 2007, 2014     HepA-ped 2 Dose 2003, 2003     HepB-Adult 2014     Influenza (IIV3) PF 2003, 2005, 04/10/2006     Influenza Intranasal Vaccine 2007     Influenza Vaccine >6 months (Alfuria,Fluzone) 2015     Meningococcal  "(Menomune ) 2005, 04/10/2006     Meningococcal ACWY (Menactra ) 2005, 04/10/2006     Rhogam 2015     TDAP Vaccine (Adacel) 2014     Td (Adult), Adsorbed 2003     Tdap (Adacel,Boostrix) 2014, 2015     Varicella 2007, 2014       OB History    Para Term  AB Living   3 2 2 0 1 2   SAB IAB Ectopic Multiple Live Births   0 0 0 0 2     Past Medical History:   Diagnosis Date     NO ACTIVE PROBLEMS      Past Surgical History:   Procedure Laterality Date     BREAST SURGERY  2/15/2014    augmentation     DENTAL SURGERY       Family History   Problem Relation Age of Onset     Family History Negative Mother      Family History Negative Father      Social History     Socioeconomic History     Marital status: Single   Tobacco Use     Smoking status: Never     Smokeless tobacco: Never   Substance and Sexual Activity     Alcohol use: No     Alcohol/week: 0.0 standard drinks     Drug use: Yes     Types: Marijuana     Comment: 1-2 times weekly     Sexual activity: Yes     Partners: Male     Birth control/protection: None     ROS  ROS: 10 point ROS neg other than the symptoms noted above in the HPI.    PHQ-9 SCORE 1/10/2023   PHQ-9 Total Score 0     MAINOR-7 SCORE 1/10/2023   Total Score 5       EXAM:  Blood pressure 112/71, pulse 93, height 1.6 m (5' 3\"), weight 46.7 kg (103 lb), last menstrual period 2022, not currently breastfeeding. Body mass index is 18.25 kg/m .  General - pleasant female in no acute distress.  Skin - no suspicious lesions or rashes  EENT-  PERRLA, euthyroid with out palpable nodules  Neck - supple without lymphadenopathy.  Lungs - clear to auscultation bilaterally.  Heart - regular rate and rhythm without murmur.  Abdomen - soft, nontender, nondistended, no masses or organomegaly noted.  Musculoskeletal - no gross deformities.  Neurological - normal strength, sensation, and mental status.    Breast Exam:  Breast: augmentation. Without " visible skin changes. No dimpling or lesions seen.   Breasts supple, non-tender with palpation, no dominant mass, nodularity, or nipple discharge noted bilaterally. Axillary nodes negative.      Pelvic Exam:  EG/BUS: Normal genital architecture without lesions, erythema or abnormal secretions Bartholin's, Urethra, [place]'s normal   Urethral meatus: normal   Urethra: no masses, tenderness, or scarring   Bladder: no masses or tenderness   Vagina: moist, pink, rugae with creamy, white and odorless  secretions  Cervix: pink, moist, closed, no CMT. Multiparous. .5cm nodule at posterior aspect of cervix  Uterus: retroverted,  and small, smooth, firm, mobile, slightly tender   Adnexa: Within normal limits and no masses, nodularity, tenderness on left. No masses, nodularity, tenderness on right   Rectum:anus normal     ASSESSMENT:  Encounter Diagnoses   Name Primary?     Visit for preventive health examination Yes     Screening for malignant neoplasm of cervix      Encounter for gynecological examination without abnormal finding      Screening for lipoid disorders      Screening for diabetes mellitus      Screening for thyroid disorder       PLAN:   Orders Placed This Encounter   Procedures     Pelvic and Breast Exam Procedure []     Pap Smear Exam [] Do Not Remove     Lipid panel reflex to direct LDL Non-fasting     Hemoglobin A1c     TSH with free T4 reflex     Additional teaching done at this visit regarding calcium (1200 mg per day), self breast exam, exercise, mental health and weight/diet.    Discussed chlamydia can test positive up to 2-3 weeks after treatment due to dead virus seen. Pt verbalizes understanding and requests test of cure today.    Discussed pap purpose and HPV transmission and follow-up if positive.     Return to clinic in one year.  Follow-up as needed.    GLORY Lozada CNM

## 2023-01-10 ENCOUNTER — APPOINTMENT (OUTPATIENT)
Dept: LAB | Facility: CLINIC | Age: 33
End: 2023-01-10
Attending: MIDWIFE
Payer: COMMERCIAL

## 2023-01-10 ENCOUNTER — OFFICE VISIT (OUTPATIENT)
Dept: OBGYN | Facility: CLINIC | Age: 33
End: 2023-01-10
Attending: MIDWIFE
Payer: COMMERCIAL

## 2023-01-10 VITALS
SYSTOLIC BLOOD PRESSURE: 112 MMHG | DIASTOLIC BLOOD PRESSURE: 71 MMHG | HEART RATE: 93 BPM | BODY MASS INDEX: 18.25 KG/M2 | HEIGHT: 63 IN | WEIGHT: 103 LBS

## 2023-01-10 DIAGNOSIS — Z13.1 SCREENING FOR DIABETES MELLITUS: ICD-10-CM

## 2023-01-10 DIAGNOSIS — Z13.29 SCREENING FOR THYROID DISORDER: ICD-10-CM

## 2023-01-10 DIAGNOSIS — Z00.00 VISIT FOR PREVENTIVE HEALTH EXAMINATION: Primary | ICD-10-CM

## 2023-01-10 DIAGNOSIS — Z12.4 SCREENING FOR MALIGNANT NEOPLASM OF CERVIX: ICD-10-CM

## 2023-01-10 DIAGNOSIS — Z13.220 SCREENING FOR LIPOID DISORDERS: ICD-10-CM

## 2023-01-10 DIAGNOSIS — Z01.419 ENCOUNTER FOR GYNECOLOGICAL EXAMINATION WITHOUT ABNORMAL FINDING: ICD-10-CM

## 2023-01-10 LAB
CHOLEST SERPL-MCNC: 247 MG/DL
FASTING STATUS PATIENT QL REPORTED: NO
HBA1C MFR BLD: 4.8 % (ref 0–5.6)
HDLC SERPL-MCNC: 75 MG/DL
LDLC SERPL CALC-MCNC: 154 MG/DL
NONHDLC SERPL-MCNC: 172 MG/DL
TRIGL SERPL-MCNC: 91 MG/DL
TSH SERPL DL<=0.005 MIU/L-ACNC: 0.76 MU/L (ref 0.4–4)

## 2023-01-10 PROCEDURE — 87624 HPV HI-RISK TYP POOLED RSLT: CPT | Performed by: MIDWIFE

## 2023-01-10 PROCEDURE — 83036 HEMOGLOBIN GLYCOSYLATED A1C: CPT | Performed by: MIDWIFE

## 2023-01-10 PROCEDURE — 99385 PREV VISIT NEW AGE 18-39: CPT | Performed by: MIDWIFE

## 2023-01-10 PROCEDURE — 87491 CHLMYD TRACH DNA AMP PROBE: CPT | Performed by: MIDWIFE

## 2023-01-10 PROCEDURE — 80061 LIPID PANEL: CPT | Performed by: MIDWIFE

## 2023-01-10 PROCEDURE — G0145 SCR C/V CYTO,THINLAYER,RESCR: HCPCS | Performed by: MIDWIFE

## 2023-01-10 PROCEDURE — 36415 COLL VENOUS BLD VENIPUNCTURE: CPT | Performed by: MIDWIFE

## 2023-01-10 PROCEDURE — G0463 HOSPITAL OUTPT CLINIC VISIT: HCPCS | Mod: 25 | Performed by: MIDWIFE

## 2023-01-10 PROCEDURE — 84443 ASSAY THYROID STIM HORMONE: CPT | Performed by: MIDWIFE

## 2023-01-10 PROCEDURE — 87591 N.GONORRHOEAE DNA AMP PROB: CPT | Performed by: MIDWIFE

## 2023-01-10 ASSESSMENT — ANXIETY QUESTIONNAIRES
6. BECOMING EASILY ANNOYED OR IRRITABLE: SEVERAL DAYS
GAD7 TOTAL SCORE: 5
7. FEELING AFRAID AS IF SOMETHING AWFUL MIGHT HAPPEN: NOT AT ALL
5. BEING SO RESTLESS THAT IT IS HARD TO SIT STILL: NOT AT ALL
GAD7 TOTAL SCORE: 5
2. NOT BEING ABLE TO STOP OR CONTROL WORRYING: SEVERAL DAYS
3. WORRYING TOO MUCH ABOUT DIFFERENT THINGS: SEVERAL DAYS
1. FEELING NERVOUS, ANXIOUS, OR ON EDGE: SEVERAL DAYS

## 2023-01-10 ASSESSMENT — PATIENT HEALTH QUESTIONNAIRE - PHQ9
5. POOR APPETITE OR OVEREATING: SEVERAL DAYS
SUM OF ALL RESPONSES TO PHQ QUESTIONS 1-9: 0

## 2023-01-10 ASSESSMENT — PAIN SCALES - GENERAL: PAINLEVEL: NO PAIN (0)

## 2023-01-10 NOTE — LETTER
1/10/2023       RE: Baljinder Perdomo  [address]  [facility] [address]     Dear Colleague,    Thank you for referring your patient, Baljinder Perdomo, to the [facility] [place] at [facility]. Please see a copy of my visit note below.    Progress Note    SUBJECTIVE:  Baljinder Perdomo is an 32 year old, , who requests an Annual Preventive Exam.     Concerns today include:     STI screen. Recent chlamydia diagnosis. Completed 1 week doxy treatment 2.5-3 weeks ago. Discussed recommendation for retest at 3 months, but patient prefers test of cure because she threw up medication. She continues to feel some sharp twinges of adnexal pain, alyssa on right.     Has had one male partner for 2-3 years, new partner just prior to diagnosis. Feels she had symptoms and was tested soon after transmission.     Pt denies emotional and physical abuse. No other safety concerns.     Eating well. Loves salad and vegetables. Has several servings of dairy per day.  C/o losing weight easily. If sick will get down to 90lbs within a couple days.     Mental health feels stable, but does report some anxiety. PHQ = 0, MAINOR = 5.     Menstrual History:  Menstrual History 6/15/2021 2022 1/10/2023   LAST MENSTRUAL PERIOD 2021       Last    Lab Results   Component Value Date    PAP NORMAL 2015     History of abnormal Pap smear: NO - age 30-65 PAP every 5 years with negative HPV co-testing recommended    Mammogram current: not applicable. Does not know maternal family history because her mom left home at a young age.     Last Colonoscopy: not applicable.      HISTORY:  No current outpatient medications on file prior to visit.  No current facility-administered medications on file prior to visit.    Allergies   Allergen Reactions     Metronidazole Hives     Possible.  Stopped taking 1 week ago.     Immunization History  "  Administered Date(s) Administered     HPV Quadrivalent 2007, 2014     HepA-ped 2 Dose 2003, 2003     HepB-Adult 2014     Influenza (IIV3) PF 2003, 2005, 04/10/2006     Influenza Intranasal Vaccine 2007     Influenza Vaccine >6 months (Alfuria,Fluzone) 2015     Meningococcal (Menomune ) 2005, 04/10/2006     Meningococcal ACWY (Menactra ) 2005, 04/10/2006     Rhogam 2015     TDAP Vaccine (Adacel) 2014     Td (Adult), Adsorbed 2003     Tdap (Adacel,Boostrix) 2014, 2015     Varicella 2007, 2014       OB History    Para Term  AB Living   3 2 2 0 1 2   SAB IAB Ectopic Multiple Live Births   0 0 0 0 2     Past Medical History:   Diagnosis Date     NO ACTIVE PROBLEMS      Past Surgical History:   Procedure Laterality Date     BREAST SURGERY  2/15/2014    augmentation     DENTAL SURGERY       Family History   Problem Relation Age of Onset     Family History Negative Mother      Family History Negative Father      Social History     Socioeconomic History     Marital status: Single   Tobacco Use     Smoking status: Never     Smokeless tobacco: Never   Substance and Sexual Activity     Alcohol use: No     Alcohol/week: 0.0 standard drinks     Drug use: Yes     Types: Marijuana     Comment: 1-2 times weekly     Sexual activity: Yes     Partners: Male     Birth control/protection: None     ROS  ROS: 10 point ROS neg other than the symptoms noted above in the HPI.    PHQ-9 SCORE 1/10/2023   PHQ-9 Total Score 0     MAINOR-7 SCORE 1/10/2023   Total Score 5       EXAM:  Blood pressure 112/71, pulse 93, height 1.6 m (5' 3\"), weight 46.7 kg (103 lb), last menstrual period 2022, not currently breastfeeding. Body mass index is 18.25 kg/m .  General - pleasant female in no acute distress.  Skin - no suspicious lesions or rashes  EENT-  PERRLA, euthyroid with out palpable nodules  Neck - supple without " lymphadenopathy.  Lungs - clear to auscultation bilaterally.  Heart - regular rate and rhythm without murmur.  Abdomen - soft, nontender, nondistended, no masses or organomegaly noted.  Musculoskeletal - no gross deformities.  Neurological - normal strength, sensation, and mental status.    Breast Exam:  Breast: augmentation. Without visible skin changes. No dimpling or lesions seen.   Breasts supple, non-tender with palpation, no dominant mass, nodularity, or nipple discharge noted bilaterally. Axillary nodes negative.      Pelvic Exam:  EG/BUS: Normal genital architecture without lesions, erythema or abnormal secretions Bartholin's, Urethra, [place]'s normal   Urethral meatus: normal   Urethra: no masses, tenderness, or scarring   Bladder: no masses or tenderness   Vagina: moist, pink, rugae with creamy, white and odorless  secretions  Cervix: pink, moist, closed, no CMT. Multiparous. .5cm nodule at posterior aspect of cervix  Uterus: retroverted,  and small, smooth, firm, mobile, slightly tender   Adnexa: Within normal limits and no masses, nodularity, tenderness on left. No masses, nodularity, tenderness on right   Rectum:anus normal     ASSESSMENT:  Encounter Diagnoses   Name Primary?     Visit for preventive health examination Yes     Screening for malignant neoplasm of cervix      Encounter for gynecological examination without abnormal finding      Screening for lipoid disorders      Screening for diabetes mellitus      Screening for thyroid disorder       PLAN:   Orders Placed This Encounter   Procedures     Pelvic and Breast Exam Procedure []     Pap Smear Exam [] Do Not Remove     Lipid panel reflex to direct LDL Non-fasting     Hemoglobin A1c     TSH with free T4 reflex     Additional teaching done at this visit regarding calcium (1200 mg per day), self breast exam, exercise, mental health and weight/diet.    Discussed chlamydia can test positive up to 2-3 weeks after treatment due to dead virus  seen. Pt verbalizes understanding and requests test of cure today.    Discussed pap purpose and HPV transmission and follow-up if positive.     Return to clinic in one year.  Follow-up as needed.    GLORY LozadaM

## 2023-01-11 LAB
C TRACH DNA SPEC QL NAA+PROBE: NEGATIVE
N GONORRHOEA DNA SPEC QL NAA+PROBE: NEGATIVE

## 2023-01-12 ENCOUNTER — MYC MEDICAL ADVICE (OUTPATIENT)
Dept: OBGYN | Facility: CLINIC | Age: 33
End: 2023-01-12

## 2023-01-12 ENCOUNTER — TELEPHONE (OUTPATIENT)
Dept: OBGYN | Facility: CLINIC | Age: 33
End: 2023-01-12

## 2023-01-12 NOTE — TELEPHONE ENCOUNTER
"[facility]    Phone Message    May a detailed message be left on voicemail: yes     Reason for Call: Requesting Results   Name/type of test: STI screening  Date of test: 1/11/23  Was test done at a location other than [facility] (Please fill in the location if not [facility])?: Yes: [facility]    Please call pt to go over test. Or send my chart \"if everything is ok.\" Thank you    Action Taken: Message routed to:  Other: Whs    Travel Screening: Not Applicable                                                                        "

## 2023-01-13 LAB
BKR LAB AP GYN ADEQUACY: NORMAL
BKR LAB AP GYN INTERPRETATION: NORMAL
BKR LAB AP HPV REFLEX: NORMAL
BKR LAB AP PREVIOUS ABNORMAL: NORMAL
PATH REPORT.COMMENTS IMP SPEC: NORMAL
PATH REPORT.COMMENTS IMP SPEC: NORMAL
PATH REPORT.RELEVANT HX SPEC: NORMAL

## 2023-01-17 LAB
HUMAN PAPILLOMA VIRUS 16 DNA: NEGATIVE
HUMAN PAPILLOMA VIRUS 18 DNA: NEGATIVE
HUMAN PAPILLOMA VIRUS FINAL DIAGNOSIS: NORMAL
HUMAN PAPILLOMA VIRUS OTHER HR: NEGATIVE

## 2023-06-08 ENCOUNTER — ANCILLARY PROCEDURE (OUTPATIENT)
Dept: GENERAL RADIOLOGY | Facility: CLINIC | Age: 33
End: 2023-06-08
Attending: PODIATRIST
Payer: COMMERCIAL

## 2023-06-08 ENCOUNTER — OFFICE VISIT (OUTPATIENT)
Dept: PODIATRY | Facility: CLINIC | Age: 33
End: 2023-06-08
Payer: COMMERCIAL

## 2023-06-08 VITALS — DIASTOLIC BLOOD PRESSURE: 72 MMHG | SYSTOLIC BLOOD PRESSURE: 98 MMHG

## 2023-06-08 DIAGNOSIS — M77.42 METATARSALGIA, LEFT FOOT: Primary | ICD-10-CM

## 2023-06-08 DIAGNOSIS — M79.671 CHRONIC HEEL PAIN, RIGHT: ICD-10-CM

## 2023-06-08 DIAGNOSIS — M79.672 CHRONIC HEEL PAIN, LEFT: ICD-10-CM

## 2023-06-08 DIAGNOSIS — M77.41 METATARSALGIA, RIGHT FOOT: ICD-10-CM

## 2023-06-08 DIAGNOSIS — G89.29 CHRONIC HEEL PAIN, LEFT: ICD-10-CM

## 2023-06-08 DIAGNOSIS — G89.29 CHRONIC HEEL PAIN, RIGHT: ICD-10-CM

## 2023-06-08 PROCEDURE — 73630 X-RAY EXAM OF FOOT: CPT | Mod: TC | Performed by: RADIOLOGY

## 2023-06-08 PROCEDURE — 99213 OFFICE O/P EST LOW 20 MIN: CPT | Performed by: PODIATRIST

## 2023-06-08 NOTE — LETTER
6/8/2023         RE: Baljinder Perdomo  [address]  [facility] [address]        Dear Colleague,    Thank you for referring your patient, Baljinder Perdomo, to the [facility] PODI[facility]RY. Please see a copy of my visit note below.    ASSESSMENT:  Encounter Diagnoses   Name Primary?     Metatarsalgia, left foot Yes     Chronic heel pain, left      Metatarsalgia, right foot      Chronic heel pain, right      MEDICAL DECISION MAKING:  No acute findings on clinical exam.    Due to her report that her left foot is more painful, x-rays were ordered.  I personally reviewed the left foot x-rays with Baljinder.  Nothing found to explain the pain in her reported painful regions.  Apparent bone cyst in the cuboid.  Well demarcated.    Her pain remains consistent with plantar fascial pain as well as metatarsalgia, likely capsulitis around the second metatarsal phalangeal joint.    Based on our discussion, she has tried many of the recommended conservative treatment options.  She did not tolerate the custom orthoses.  Recent use of metatarsal pads have helped.    Recommendations:  Stiff soled shoes  Cushioned insoles  Felt metatarsal pads  Avoidance of barefoot walking  Voltaren gel  Calf stretching    Due to the chronicity of the pain and somewhat unsuccessful conservative treatment, she is referred to pain management for evaluation and possible acupuncture.    On an as-needed basis    Disclaimer: This note consists of symbols derived from keyboarding, dictation and/or voice recognition software. As a result, there may be errors in the script that have gone undetected. Please consider this when interpreting information found in this chart.    Delonte Ellsworth, KIM, FACFAS, [facility] Department of Podiatry/Foot & Ankle Surgery      ____________________________________________________________________    HPI:       Baljinder Perdomo follows up today with bilateral foot pain, similar to what I  evaluated her for in March 2021.  She reports ongoing bilateral heel and forefoot pain.  Custom orthoses were prescribed at her last visit.  These ultimately did not provide pain relief.  She is seeing other physicians.  She does some stretching exercises.  Previous naproxen did not relieve pain.  *  Past Medical History:   Diagnosis Date     NO ACTIVE PROBLEMS    *  *  Past Surgical History:   Procedure Laterality Date     BREAST SURGERY  2/15/2014    augmentation     DENTAL SURGERY  2014   *  *  No current outpatient medications on file.         EXAM:    Vitals: BP 98/72   BMI: There is no height or weight on file to calculate BMI.    Constitutional:  Baljinder Perdomo is in no apparent distress, appears well-nourished.  Cooperative with history and physical exam.     Vascular:  Pedal pulses are palpable for both the DP and PT arteries.  CFT < 3 sec.  No edema.       Neuro: Light touch sensation is intact to the L4, L5, S1 distributions  No evidence of weakness, spasticity, or contracture in the lower extremities.      Derm: Normal texture and turgor.  No erythema, ecchymosis, or cyanosis.  No open lesions.      Musculoskeletal:    Lower extremity muscle strength is normal. No gross deformities.  Pain on palpation: Pain on palpation to the plantar medial aspect of the bilateral heel.  No significant pain with side-to-side compression of the heel.    Bilateral plantar forefoot pain notably under the second metatarsophalangeal joint.  She also specifies pain under the first and fifth.      X-Ray Findings:  I personally reviewed the left foot images.  See comments above            Again, thank you for allowing me to participate in the care of your patient.        Sincerely,        Delonte Ellsworth DPM

## 2023-06-08 NOTE — PROGRESS NOTES
ASSESSMENT:  Encounter Diagnoses   Name Primary?     Metatarsalgia, left foot Yes     Chronic heel pain, left      Metatarsalgia, right foot      Chronic heel pain, right      MEDICAL DECISION MAKING:  No acute findings on clinical exam.    Due to her report that her left foot is more painful, x-rays were ordered.  I personally reviewed the left foot x-rays with Baljinder.  Nothing found to explain the pain in her reported painful regions.  Apparent bone cyst in the cuboid.  Well demarcated.    Her pain remains consistent with plantar fascial pain as well as metatarsalgia, likely capsulitis around the second metatarsal phalangeal joint.    Based on our discussion, she has tried many of the recommended conservative treatment options.  She did not tolerate the custom orthoses.  Recent use of metatarsal pads have helped.    Recommendations:  Stiff soled shoes  Cushioned insoles  Felt metatarsal pads  Avoidance of barefoot walking  Voltaren gel  Calf stretching    Due to the chronicity of the pain and somewhat unsuccessful conservative treatment, she is referred to pain management for evaluation and possible acupuncture.    On an as-needed basis    Disclaimer: This note consists of symbols derived from keyboarding, dictation and/or voice recognition software. As a result, there may be errors in the script that have gone undetected. Please consider this when interpreting information found in this chart.    Delonte Ellsworth DPM, FACFAS, MS    [place] Department of Podiatry/Foot & Ankle Surgery      ____________________________________________________________________    HPI:       Baljinder GERMAIN Conor follows up today with bilateral foot pain, similar to what I evaluated her for in March 2021.  She reports ongoing bilateral heel and forefoot pain.  Custom orthoses were prescribed at her last visit.  These ultimately did not provide pain relief.  She is seeing other physicians.  She does some stretching exercises.  Previous  naproxen did not relieve pain.  *  Past Medical History:   Diagnosis Date     NO ACTIVE PROBLEMS    *  *  Past Surgical History:   Procedure Laterality Date     BREAST SURGERY  2/15/2014    augmentation     DENTAL SURGERY  2014   *  *  No current outpatient medications on file.         EXAM:    Vitals: BP 98/72   BMI: There is no height or weight on file to calculate BMI.    Constitutional:  Baljinder Perdomo is in no apparent distress, appears well-nourished.  Cooperative with history and physical exam.     Vascular:  Pedal pulses are palpable for both the DP and PT arteries.  CFT < 3 sec.  No edema.       Neuro: Light touch sensation is intact to the L4, L5, S1 distributions  No evidence of weakness, spasticity, or contracture in the lower extremities.      Derm: Normal texture and turgor.  No erythema, ecchymosis, or cyanosis.  No open lesions.      Musculoskeletal:    Lower extremity muscle strength is normal. No gross deformities.  Pain on palpation: Pain on palpation to the plantar medial aspect of the bilateral heel.  No significant pain with side-to-side compression of the heel.    Bilateral plantar forefoot pain notably under the second metatarsophalangeal joint.  She also specifies pain under the first and fifth.      X-Ray Findings:  I personally reviewed the left foot images.  See comments above

## 2023-06-08 NOTE — PATIENT INSTRUCTIONS
Thank you for choosing [facility] Podiatry / Foot & Ankle Surgery!    DR. MONTANO'S CLINIC LOCATIONS:     [facility] TRIAGE LINE: [phone]   [address] [place] [address] APPOINTMENTS: [phone]   [place] [address] RADIOLOGY: [phone]   (Every other Tues - Wed - Fri PM) SET UP SURGERY: [phone]    PHYSICAL THERAPY: [phone]   [facility] SPECIALTY BILLING QUESTIONS: [phone]   [address] FAX: [phone]   [place] [address]    (Thurs & Fri AM)      PLANTAR FASCIITIS  Plantar fasciitis is often referred to as heel spurs or heel pain. Plantar fasciitis is a very common problem that affects people of all foot shapes, age, weight and activity level. Pain may be in the arch or on the weight-bearing surface of the heel. The pain may come on without injury or identifiable cause. Pain is generally present when first getting out of bed in the morning or up from a seated break.     CAUSES  The plantar fascia is a dense fibrous band of tissue that stretches across the bottom surface of the foot. The fascia helps support the foot muscles and arch. Plantar fasciitis is thought to be caused by mechanical strain or overload. Frequent walking without shoes or wearing unsupportive shoes is thought to cause structural overload and ultimately inflammation of the plantar fascia. Some people have heel spurs that can be seen on x-ray. The heel spur is actually a minor component of plantar fascitis and is largely ignored.       SELF TREATMENT   The easiest solution is to stop walking around your home without shoes. Plantar fasciitis is largely a shoe problem. Shoes are either not being worn often enough or your current shoes are inadequate for your weight, foot structure or activity level. The majority of shoes on the market today are not sufficient to resist development of plantar fasciitis or to promote healing. Assume that your current shoes are inadequate and will need to be replaced. Even  high quality shoes wear out with 6 months to one year of frequent use. Weight loss is another option. Losing ten pounds in the next two months may be enough to resolve the problem. Ice applied to the area of pain two to three times per day for ten minutes each session can be very helpful. Warm foot soaks in epsom salts can also relieve pain. This should continue until the problem resolves. Achilles tendon stretching is essential. Stretch multiple times daily to promote healing and to prevent recurrence in the future. Over all stretching of the body is helpful as well such as the calves, thighs and lower back. Normally when one area of the body is tight, other areas are too. Gentle Yoga can be good for this.     Over the counter topical anti inflammatories can be helpful such as biofreeze, bengay, salon pas, ect...  Oral ibuprofen or aleve is recommended as well to try to calm down inflammation.     Night splints can be helpful to gradually stretch the foot at night as a lot of pain is when you get up in the morning. Taking a towel or thera band and stretching the foot back multiple times before you get ou of bed can be beneficial as well.     MEDICAL TREATMENT  Medical treatments often include custom arch supports, cortisone injections, physical therapy, splints to be worn in bed, prescription medications and surgery. The home treatments listed above will be necessary regardless of these advanced medical treatments. Surgery is rarely needed but is very helpful in selected cases.     PROGNOSIS  Plantar fasciitis can last from one day to a lifetime. Some people get intermittent fascitis that is very short-lived. Others suffer daily for years. Excessive body weight, frequent bare foot walking, long hours on the feet, inadequate shoes, predisposing foot structures and excessive activity such as running are all potential issues that lead to chronic and/or recurring plantar fascitis. Having plantar fasciitis means that  you are forever prone to this problem and will require modification of some of the above factors. Most people seek treatment within one to four months. Healing usually requires a similar one to four month time frame. Healing time is relative to the amount of effort spent treating the problem.   Plantar fasciitis is highly recurrent. Risk factors often continue, including return to bare foot walking, inadequate shoes, excessive body weight, excessive activities, etc. Your life style and foot structure may predispose you to recurrent plantar fasciitis. A daily prevention regimen can be very helpful. Ongoing use of shoe inserts, careful attention to appropriate shoes, daily Achilles stretching, etc. may prevent recurrence. Prompt attention at the earliest warning signs of heel pain can resolve the problem in as short as a few days.     EXERCISES  Stair Exercise: Step on the stairs with the ball of your foot and hold your position for at least 15 seconds, then slowly step down with the heels of your foot. You can do this daily and as often as you want.   Picking the Towel: Sit comfortably and then pick the towel up with your toes. You can use any object other than a towel as long as the material can be soft and you can pick it up with your toes.  Rolling the Bottle: Use a small ball or frozen water bottle and then roll it around with your foot.   Flex the Toes: Sit comfortably and then flex your toes by pointing it towards the floor or towards your body. This will relax and flex your foot and exercise your plantar fascia, the calf, and the Achilles tendon. The inability of the foot to stretch often causes the bunching up of the plantar fascia area leading to the pain.  Calf/Achilles Stretching: Lay on you back and raise one foot, then point your toes towards the floor. See photo below:               Hold each stretch for 10 seconds. Stretch 10 times per set, three sets per day. Morning, afternoon and evening. If your  heel pain is very severe in the morning, consider doing the first set of stretches before you get out of bed.      OVER THE COUNTER INSERT RECOMMENDATIONS  SuperFeet   Sofsole Fit Spenco   Power Step   Walk-Fit Arch Cradles     Most of these can be found at your local [employer], [employer], or online.  **A good high quality over the counter insert should cost around $40-$50      [email or web address] LOCATIONS  [place]  [address] [facility]  [phone]   [address]  [phone] [place]  [address] [facility]  [phone]   [place]  [address]  [phone]   [place]  [address]  [phone] [place]  [address] [place] [address] [place]  [phone]   [place]  [address]  [phone]   [place]  [address] [place] [address]  [phone] [place]  [address]  [phone]       CAPSULITIS / METATARSALGIA  All joints in the body are surrounded by a capsule, or a covering of soft tissue and ligaments. The capsule holds bones together and secretes joint fluid to help lubricate the joint. If a joint capsule is exposed to excessive force, it can develop microscopic tears and become inflamed. This commonly occurs in the foot due to mild variation in anatomy. Hammertoes, bunions, irregular bone length, joint immobility, etc. can all lead to excessive force on the joint. Capsule injury can also occur due to repetitive stress from exercise, insufficient support from shoes, excessive bare foot walking and excessive weight.      Conservative treatments include ice, rest from the aggravating activity, weight loss, orthotic inserts, improving shoes and shoe modifications. You can purchase an over the counter felt metatarsal pad to place in your shoes at [facility] or on [employer]. Appropriate shoes will protect the inflamed tissue improving the chances of healing. Avoidance of standing or walking barefoot, including around the house, is necessary to allow healing.  Casts are sometimes used for more aggressive protection.  NSAIDs such as Advil are also used to help with pain and decreasing inflammation. If pain continues over a period of weeks with continuous rest and icing, Corticosteroid injections can be a treatment option to try and help decrease inflammation.    Surgery is often necessary to correct the underlying structural problem. Surgery might include shortening an excessively long bone, repairing bunion or hammertoe, lengthening a tight Achilles  tendon, etc. These are same day surgeries that might be pursued if more conservative measures fail to provide relief.      The inflamed joint capsule has the potential to completely tear. This will allow the toe to drift off the ground, curving toward the other toes. The involved toe may under or overlap the adjacent toes as drift continues. The pain may improve after the joint tears or this new position will be permanent. Surgery can address the toe alignment. Your goal of treating capsulitis is to avoid this scenario.        ** You can find felt metatarsal pads to place in your shoes at our [facility], [employer], [employer], or on [employer] **

## 2023-08-10 ENCOUNTER — OFFICE VISIT (OUTPATIENT)
Dept: PEDIATRICS | Facility: CLINIC | Age: 33
End: 2023-08-10
Payer: COMMERCIAL

## 2023-08-10 VITALS
RESPIRATION RATE: 14 BRPM | TEMPERATURE: 98 F | HEIGHT: 63 IN | HEART RATE: 69 BPM | OXYGEN SATURATION: 99 % | BODY MASS INDEX: 18.75 KG/M2 | DIASTOLIC BLOOD PRESSURE: 58 MMHG | SYSTOLIC BLOOD PRESSURE: 104 MMHG | WEIGHT: 105.8 LBS

## 2023-08-10 DIAGNOSIS — H61.22 IMPACTED CERUMEN OF LEFT EAR: ICD-10-CM

## 2023-08-10 DIAGNOSIS — Z00.00 ROUTINE GENERAL MEDICAL EXAMINATION AT A HEALTH CARE FACILITY: Primary | ICD-10-CM

## 2023-08-10 DIAGNOSIS — N89.8 VAGINAL DISCHARGE: ICD-10-CM

## 2023-08-10 DIAGNOSIS — B96.89 BV (BACTERIAL VAGINOSIS): ICD-10-CM

## 2023-08-10 DIAGNOSIS — N76.0 BV (BACTERIAL VAGINOSIS): ICD-10-CM

## 2023-08-10 LAB
CLUE CELLS: PRESENT
TRICHOMONAS, WET PREP: ABNORMAL
WBC'S/HIGH POWER FIELD, WET PREP: ABNORMAL
YEAST, WET PREP: ABNORMAL

## 2023-08-10 PROCEDURE — 87210 SMEAR WET MOUNT SALINE/INK: CPT | Performed by: NURSE PRACTITIONER

## 2023-08-10 PROCEDURE — 99213 OFFICE O/P EST LOW 20 MIN: CPT | Mod: 25 | Performed by: NURSE PRACTITIONER

## 2023-08-10 PROCEDURE — 69209 REMOVE IMPACTED EAR WAX UNI: CPT | Mod: LT | Performed by: NURSE PRACTITIONER

## 2023-08-10 PROCEDURE — 99395 PREV VISIT EST AGE 18-39: CPT | Mod: 25 | Performed by: NURSE PRACTITIONER

## 2023-08-10 RX ORDER — CLINDAMYCIN PHOSPHATE 20 MG/G
CREAM VAGINAL
Qty: 40 G | Refills: 0 | Status: SHIPPED | OUTPATIENT
Start: 2023-08-10 | End: 2024-08-12

## 2023-08-10 SDOH — ECONOMIC STABILITY: TRANSPORTATION INSECURITY
IN THE PAST 12 MONTHS, HAS LACK OF TRANSPORTATION KEPT YOU FROM MEETINGS, WORK, OR FROM GETTING THINGS NEEDED FOR DAILY LIVING?: PATIENT DECLINED

## 2023-08-10 SDOH — ECONOMIC STABILITY: FOOD INSECURITY: WITHIN THE PAST 12 MONTHS, THE FOOD YOU BOUGHT JUST DIDN'T LAST AND YOU DIDN'T HAVE MONEY TO GET MORE.: PATIENT DECLINED

## 2023-08-10 SDOH — ECONOMIC STABILITY: FOOD INSECURITY: WITHIN THE PAST 12 MONTHS, YOU WORRIED THAT YOUR FOOD WOULD RUN OUT BEFORE YOU GOT MONEY TO BUY MORE.: PATIENT DECLINED

## 2023-08-10 SDOH — ECONOMIC STABILITY: TRANSPORTATION INSECURITY
IN THE PAST 12 MONTHS, HAS THE LACK OF TRANSPORTATION KEPT YOU FROM MEDICAL APPOINTMENTS OR FROM GETTING MEDICATIONS?: NO

## 2023-08-10 SDOH — ECONOMIC STABILITY: INCOME INSECURITY: IN THE LAST 12 MONTHS, WAS THERE A TIME WHEN YOU WERE NOT ABLE TO PAY THE MORTGAGE OR RENT ON TIME?: PATIENT REFUSED

## 2023-08-10 SDOH — HEALTH STABILITY: PHYSICAL HEALTH: ON AVERAGE, HOW MANY DAYS PER WEEK DO YOU ENGAGE IN MODERATE TO STRENUOUS EXERCISE (LIKE A BRISK WALK)?: 0 DAYS

## 2023-08-10 SDOH — HEALTH STABILITY: PHYSICAL HEALTH: ON AVERAGE, HOW MANY MINUTES DO YOU ENGAGE IN EXERCISE AT THIS LEVEL?: 0 MIN

## 2023-08-10 SDOH — ECONOMIC STABILITY: INCOME INSECURITY: HOW HARD IS IT FOR YOU TO PAY FOR THE VERY BASICS LIKE FOOD, HOUSING, MEDICAL CARE, AND HEATING?: PATIENT DECLINED

## 2023-08-10 ASSESSMENT — SOCIAL DETERMINANTS OF HEALTH (SDOH)
HOW OFTEN DO YOU ATTEND CHURCH OR RELIGIOUS SERVICES?: PATIENT DECLINED
ARE YOU MARRIED, WIDOWED, DIVORCED, SEPARATED, NEVER MARRIED, OR LIVING WITH A PARTNER?: NEVER MARRIED
IN A TYPICAL WEEK, HOW MANY TIMES DO YOU TALK ON THE PHONE WITH FAMILY, FRIENDS, OR NEIGHBORS?: MORE THAN THREE TIMES A WEEK
DO YOU BELONG TO ANY CLUBS OR ORGANIZATIONS SUCH AS CHURCH GROUPS UNIONS, FRATERNAL OR ATHLETIC GROUPS, OR SCHOOL GROUPS?: PATIENT DECLINED
HOW OFTEN DO YOU GET TOGETHER WITH FRIENDS OR RELATIVES?: ONCE A WEEK

## 2023-08-10 ASSESSMENT — ENCOUNTER SYMPTOMS
NERVOUS/ANXIOUS: 0
DIARRHEA: 0
HEMATURIA: 0
PARESTHESIAS: 0
SORE THROAT: 0
NAUSEA: 0
DYSURIA: 0
ABDOMINAL PAIN: 0
MYALGIAS: 0
COUGH: 0
FREQUENCY: 0
EYE PAIN: 0
DIZZINESS: 0
CHILLS: 0
FEVER: 0
PALPITATIONS: 0
ARTHRALGIAS: 0
CONSTIPATION: 0
SHORTNESS OF BREATH: 0
BREAST MASS: 0
JOINT SWELLING: 0
HEADACHES: 0
HEMATOCHEZIA: 0
WEAKNESS: 0
HEARTBURN: 0

## 2023-08-10 ASSESSMENT — PAIN SCALES - GENERAL: PAINLEVEL: NO PAIN (0)

## 2023-08-10 ASSESSMENT — LIFESTYLE VARIABLES
HOW OFTEN DO YOU HAVE A DRINK CONTAINING ALCOHOL: MONTHLY OR LESS
AUDIT-C TOTAL SCORE: 1
HOW MANY STANDARD DRINKS CONTAINING ALCOHOL DO YOU HAVE ON A TYPICAL DAY: 1 OR 2
SKIP TO QUESTIONS 9-10: 1
HOW OFTEN DO YOU HAVE SIX OR MORE DRINKS ON ONE OCCASION: NEVER

## 2023-08-10 NOTE — NURSING NOTE
Left ear wash completed. moderate amount of cerumen removed from left ear. Patient tolerated procedure well. Some cerumen still in ear - provider returned to room and removed.  Maral Valdez, CMA

## 2023-08-10 NOTE — PROGRESS NOTES
SUBJECTIVE:   CC: Baljinder is an 33 year old who presents for preventive health visit.       8/10/2023     2:49 PM   Additional Questions   Roomed by Maral Valdez CMA       Healthy Habits:     Getting at least 3 servings of Calcium per day:  Yes    Bi-annual eye exam:  NO    Dental care twice a year:  NO    Sleep apnea or symptoms of sleep apnea:  Daytime drowsiness    Diet:  Regular (no restrictions)    Frequency of exercise:  2-3 days/week    Duration of exercise:  30-45 minutes    Taking medications regularly:  Yes    Medication side effects:  Not applicable    Additional concerns today:  No  Concerns today: right ear bothering her  NOT fasting today    Recurrent BV.     Gets monthly menses.     Social History     Tobacco Use    Smoking status: Never    Smokeless tobacco: Never   Substance Use Topics    Alcohol use: No     Alcohol/week: 0.0 standard drinks of alcohol             8/10/2023     2:46 PM   Alcohol Use   Prescreen: >3 drinks/day or >7 drinks/week? Not Applicable     Reviewed orders with patient.  Reviewed health maintenance and updated orders accordingly - Yes      Breast Cancer Screenin/10/2023     2:48 PM   Breast CA Risk Assessment (FHS-7)   Do you have a family history of breast, colon, or ovarian cancer? No / Unknown           Pertinent mammograms are reviewed under the imaging tab.    History of abnormal Pap smear: NO - age 30-65 PAP every 5 years with negative HPV co-testing recommended      Latest Ref Rng & Units 1/10/2023     3:40 PM 3/6/2015    12:00 AM   PAP / HPV   PAP  Negative for Intraepithelial Lesion or Malignancy (NILM)     PAP (Historical)   NORMAL       HPV 16 DNA Negative Negative     HPV 18 DNA Negative Negative     Other HR HPV Negative Negative         This result is from an external source.     Reviewed and updated as needed this visit by clinical staff   Tobacco  Allergies  Meds              Reviewed and updated as needed this visit by Provider     Meds    "              Review of Systems   Constitutional:  Negative for chills and fever.   HENT:  Positive for ear pain. Negative for congestion, hearing loss and sore throat.    Eyes:  Negative for pain and visual disturbance.   Respiratory:  Negative for cough and shortness of breath.    Cardiovascular:  Negative for chest pain, palpitations and peripheral edema.   Gastrointestinal:  Negative for abdominal pain, constipation, diarrhea, heartburn, hematochezia and nausea.   Breasts:  Negative for tenderness, breast mass and discharge.   Genitourinary:  Negative for dysuria, frequency, genital sores, hematuria, pelvic pain, urgency, vaginal bleeding and vaginal discharge.   Musculoskeletal:  Negative for arthralgias, joint swelling and myalgias.   Skin:  Negative for rash.   Neurological:  Negative for dizziness, weakness, headaches and paresthesias.   Psychiatric/Behavioral:  Negative for mood changes. The patient is not nervous/anxious.           OBJECTIVE:   /58 (BP Location: Right arm, Cuff Size: Adult Small)   Pulse 69   Temp 98  F (36.7  C) (Tympanic)   Resp 14   Ht 1.594 m (5' 2.75\")   Wt 48 kg (105 lb 12.8 oz)   LMP  (LMP Unknown)   SpO2 99%   BMI 18.89 kg/m    Physical Exam  GENERAL: healthy, alert and no distress  EYES: Eyes grossly normal to inspection, PERRL and conjunctivae and sclerae normal  HENT: ear canals and TM's normal, nose and mouth without ulcers or lesions  NECK: no adenopathy, no asymmetry, masses, or scars and thyroid normal to palpation  RESP: lungs clear to auscultation - no rales, rhonchi or wheezes  CV: regular rate and rhythm, normal S1 S2, no S3 or S4, no murmur, click or rub, no peripheral edema and peripheral pulses strong  ABDOMEN: soft, nontender, no hepatosplenomegaly, no masses and bowel sounds normal   (female): normal female external genitalia, normal urethral meatus, vaginal mucosa, normal cervix/adnexa/uterus without masses or discharge  MS: no gross musculoskeletal " defects noted, no edema        ASSESSMENT/PLAN:   (Z00.00) Routine general medical examination at a health care facility  (primary encounter diagnosis)  Comment:   Plan:     (N89.8) Vaginal discharge  Comment: discussed recurrent BV, no douching and kathy use water to vagina, could consider condom brands, lube and/or partners ejaculate as triggers, could consider daily probiotc and weeky boric acid vag suppository  Plan: Wet prep - lab collect          (H61.22) Impacted cerumen of left ear  Comment: spatula used to remove cerumen of L ear successfully after MA did wash, TM visualized  Plan:           COUNSELING:  Reviewed preventive health counseling, as reflected in patient instructions  Special attention given to:        Regular exercise       Healthy diet/nutrition       Contraception       Family planning       Osteoporosis prevention/bone health       Safe sex practices/STD prevention        She reports that she has never smoked. She has never used smokeless tobacco.          GLORY Slater CNP  [facility]

## 2023-08-11 ENCOUNTER — TELEPHONE (OUTPATIENT)
Dept: PEDIATRICS | Facility: CLINIC | Age: 33
End: 2023-08-11
Payer: COMMERCIAL

## 2023-08-11 DIAGNOSIS — N76.0 BV (BACTERIAL VAGINOSIS): Primary | ICD-10-CM

## 2023-08-11 DIAGNOSIS — B96.89 BV (BACTERIAL VAGINOSIS): Primary | ICD-10-CM

## 2023-08-11 RX ORDER — METRONIDAZOLE 7.5 MG/G
1 GEL VAGINAL DAILY
Qty: 35 G | Refills: 0 | Status: SHIPPED | OUTPATIENT
Start: 2023-08-11 | End: 2023-08-18

## 2023-08-11 NOTE — TELEPHONE ENCOUNTER
Patient calling about prescription given yesterday by AMS.  Patient has concern about clindamycin. States tube is very small and does not believe she has enough. Also states she would have rather had the metronidazole as she states she is NOT allergic. Patient requesting metronidazole Rx.  Provider please advise.  Maral Valdez CMA

## 2024-05-29 ENCOUNTER — E-VISIT (OUTPATIENT)
Dept: PEDIATRICS | Facility: CLINIC | Age: 34
End: 2024-05-29
Payer: COMMERCIAL

## 2024-05-29 ENCOUNTER — LAB (OUTPATIENT)
Dept: LAB | Facility: CLINIC | Age: 34
End: 2024-05-29
Payer: COMMERCIAL

## 2024-05-29 DIAGNOSIS — N76.0 ACUTE VAGINITIS: ICD-10-CM

## 2024-05-29 DIAGNOSIS — N76.0 BV (BACTERIAL VAGINOSIS): ICD-10-CM

## 2024-05-29 DIAGNOSIS — B96.89 BV (BACTERIAL VAGINOSIS): ICD-10-CM

## 2024-05-29 DIAGNOSIS — N76.0 ACUTE VAGINITIS: Primary | ICD-10-CM

## 2024-05-29 LAB
ALBUMIN UR-MCNC: >=300 MG/DL
AMORPH CRY #/AREA URNS HPF: ABNORMAL /HPF
APPEARANCE UR: ABNORMAL
BACTERIA #/AREA URNS HPF: ABNORMAL /HPF
BILIRUB UR QL STRIP: NEGATIVE
CLUE CELLS: PRESENT
COLOR UR AUTO: YELLOW
GLUCOSE UR STRIP-MCNC: NEGATIVE MG/DL
GRAN CASTS #/AREA URNS LPF: ABNORMAL /LPF
HGB UR QL STRIP: ABNORMAL
KETONES UR STRIP-MCNC: ABNORMAL MG/DL
LEUKOCYTE ESTERASE UR QL STRIP: NEGATIVE
MUCOUS THREADS #/AREA URNS LPF: PRESENT /LPF
NITRATE UR QL: NEGATIVE
PH UR STRIP: 7 [PH] (ref 5–7)
RBC #/AREA URNS AUTO: ABNORMAL /HPF
SP GR UR STRIP: >=1.03 (ref 1–1.03)
SQUAMOUS #/AREA URNS AUTO: ABNORMAL /LPF
TRICHOMONAS, WET PREP: ABNORMAL
UROBILINOGEN UR STRIP-ACNC: 1 E.U./DL
WBC #/AREA URNS AUTO: ABNORMAL /HPF
WBC CLUMPS #/AREA URNS HPF: PRESENT /HPF
WBC'S/HIGH POWER FIELD, WET PREP: ABNORMAL
YEAST, WET PREP: ABNORMAL

## 2024-05-29 PROCEDURE — 81001 URINALYSIS AUTO W/SCOPE: CPT

## 2024-05-29 PROCEDURE — 87210 SMEAR WET MOUNT SALINE/INK: CPT

## 2024-05-29 PROCEDURE — 99421 OL DIG E/M SVC 5-10 MIN: CPT | Performed by: NURSE PRACTITIONER

## 2024-05-29 PROCEDURE — 87591 N.GONORRHOEAE DNA AMP PROB: CPT

## 2024-05-29 PROCEDURE — 87086 URINE CULTURE/COLONY COUNT: CPT

## 2024-05-29 PROCEDURE — 87491 CHLMYD TRACH DNA AMP PROBE: CPT

## 2024-05-29 NOTE — PATIENT INSTRUCTIONS
Thank you for choosing us for your care. Given your symptoms, I would like you to do a lab-only visit to determine what is causing them.  I have placed the orders.  Please schedule an appointment with the lab right here in [facility], or call [phone].  I will let you know when the results are back and next steps to take.    Schedule a Lab Only appointment here.

## 2024-05-30 RX ORDER — SULFAMETHOXAZOLE/TRIMETHOPRIM 800-160 MG
1 TABLET ORAL 2 TIMES DAILY
Qty: 6 TABLET | Refills: 0 | Status: SHIPPED | OUTPATIENT
Start: 2024-05-30 | End: 2024-06-02

## 2024-05-30 RX ORDER — METRONIDAZOLE 500 MG/1
500 TABLET ORAL 2 TIMES DAILY
Qty: 14 TABLET | Refills: 0 | Status: SHIPPED | OUTPATIENT
Start: 2024-05-30 | End: 2024-06-06

## 2024-05-31 LAB
BACTERIA UR CULT: NO GROWTH
C TRACH DNA SPEC QL NAA+PROBE: NEGATIVE
N GONORRHOEA DNA SPEC QL NAA+PROBE: NEGATIVE

## 2024-08-12 ENCOUNTER — OFFICE VISIT (OUTPATIENT)
Dept: INTERNAL MEDICINE | Facility: CLINIC | Age: 34
End: 2024-08-12
Payer: COMMERCIAL

## 2024-08-12 VITALS
RESPIRATION RATE: 16 BRPM | OXYGEN SATURATION: 100 % | WEIGHT: 106 LBS | HEIGHT: 63 IN | BODY MASS INDEX: 18.78 KG/M2 | DIASTOLIC BLOOD PRESSURE: 71 MMHG | HEART RATE: 82 BPM | TEMPERATURE: 97.5 F | SYSTOLIC BLOOD PRESSURE: 105 MMHG

## 2024-08-12 DIAGNOSIS — E78.5 HYPERLIPIDEMIA LDL GOAL <130: ICD-10-CM

## 2024-08-12 DIAGNOSIS — Z00.00 ENCOUNTER FOR PREVENTIVE HEALTH EXAMINATION: Primary | ICD-10-CM

## 2024-08-12 DIAGNOSIS — Z13.0 SCREENING FOR IRON DEFICIENCY ANEMIA: ICD-10-CM

## 2024-08-12 DIAGNOSIS — R10.2 PELVIC PAIN IN FEMALE: ICD-10-CM

## 2024-08-12 DIAGNOSIS — Z13.1 SCREENING FOR DIABETES MELLITUS: ICD-10-CM

## 2024-08-12 LAB
ANION GAP SERPL CALCULATED.3IONS-SCNC: 11 MMOL/L (ref 7–15)
BUN SERPL-MCNC: 9.7 MG/DL (ref 6–20)
CALCIUM SERPL-MCNC: 9.5 MG/DL (ref 8.8–10.4)
CHLORIDE SERPL-SCNC: 107 MMOL/L (ref 98–107)
CHOLEST SERPL-MCNC: 228 MG/DL
CLUE CELLS: ABNORMAL
CREAT SERPL-MCNC: 0.66 MG/DL (ref 0.51–0.95)
EGFRCR SERPLBLD CKD-EPI 2021: >90 ML/MIN/1.73M2
FASTING STATUS PATIENT QL REPORTED: NO
FASTING STATUS PATIENT QL REPORTED: NO
GLUCOSE SERPL-MCNC: 100 MG/DL (ref 70–99)
HCO3 SERPL-SCNC: 21 MMOL/L (ref 22–29)
HDLC SERPL-MCNC: 75 MG/DL
LDLC SERPL CALC-MCNC: 141 MG/DL
NONHDLC SERPL-MCNC: 153 MG/DL
POTASSIUM SERPL-SCNC: 3.7 MMOL/L (ref 3.4–5.3)
SODIUM SERPL-SCNC: 139 MMOL/L (ref 135–145)
TRICHOMONAS, WET PREP: ABNORMAL
TRIGL SERPL-MCNC: 58 MG/DL
WBC'S/HIGH POWER FIELD, WET PREP: ABNORMAL
YEAST, WET PREP: ABNORMAL

## 2024-08-12 PROCEDURE — 87210 SMEAR WET MOUNT SALINE/INK: CPT

## 2024-08-12 PROCEDURE — 36415 COLL VENOUS BLD VENIPUNCTURE: CPT

## 2024-08-12 PROCEDURE — 80061 LIPID PANEL: CPT

## 2024-08-12 PROCEDURE — 99395 PREV VISIT EST AGE 18-39: CPT

## 2024-08-12 PROCEDURE — 87591 N.GONORRHOEAE DNA AMP PROB: CPT

## 2024-08-12 PROCEDURE — 80048 BASIC METABOLIC PNL TOTAL CA: CPT

## 2024-08-12 PROCEDURE — 87491 CHLMYD TRACH DNA AMP PROBE: CPT

## 2024-08-12 PROCEDURE — 99214 OFFICE O/P EST MOD 30 MIN: CPT | Mod: 25

## 2024-08-12 RX ORDER — LACTOBACILLUS RHAMNOSUS GG 10B CELL
CAPSULE ORAL 2 TIMES DAILY
COMMUNITY

## 2024-08-12 SDOH — HEALTH STABILITY: PHYSICAL HEALTH: ON AVERAGE, HOW MANY DAYS PER WEEK DO YOU ENGAGE IN MODERATE TO STRENUOUS EXERCISE (LIKE A BRISK WALK)?: 2 DAYS

## 2024-08-12 SDOH — HEALTH STABILITY: PHYSICAL HEALTH: ON AVERAGE, HOW MANY MINUTES DO YOU ENGAGE IN EXERCISE AT THIS LEVEL?: 90 MIN

## 2024-08-12 ASSESSMENT — SOCIAL DETERMINANTS OF HEALTH (SDOH): HOW OFTEN DO YOU GET TOGETHER WITH FRIENDS OR RELATIVES?: TWICE A WEEK

## 2024-08-12 NOTE — PROGRESS NOTES
Preventive Care Visit  [facility]  GLORY Lares CNP, Internal Medicine  Aug 12, 2024      Assessment & Plan     (Z00.00) Encounter for preventive health examination  (primary encounter diagnosis)  Comment: pt presents for annual visit   Plan:     (R10.2) Pelvic pain in female  Comment: She has history of recurrent BV. Today she presents with symptoms in her pelvic region which she tells me she often has when she has an episode of BV. OF note, she is currently menstruating.   She denies any malodorous discharge or increase in vaginal discharge. No fevers, dysuria, or hematuria.   She does endorse dyspareunia as of recently.  Her last episode of BV was on 5/29/24 when she was treated with Flagyl.   We talked about importance of only checking wet prep for BV with new symptoms as sometimes patients can test positive without active BV infection and thus wouldn't require treatment.  Up until about 3 weeks ago she was using weekly boric acid vaginal suppositories. She is also taking culturell BID.   We will check urine, G/C today as well as wet prep.  I will also provide referral to OB/GYN.   Plan: NEISSERIA GONORRHOEA PCR, CHLAMYDIA TRACHOMATIS        PCR, Wet prep - lab collect, Ob/Gyn          Referral            (E78.5) Hyperlipidemia LDL goal <130  Comment:   Plan: Lipid panel reflex to direct LDL Fasting            (Z13.1) Screening for diabetes mellitus  Comment:   Plan: Basic metabolic panel  (Ca, Cl, CO2, Creat,         Gluc, K, Na, BUN)                  Counseling  Appropriate preventive services were addressed with this patient via screening, questionnaire, or discussion as appropriate for fall prevention, nutrition, physical activity, Tobacco-use cessation, weight loss and cognition.  Checklist reviewing preventive services available has been given to the patient.  Reviewed patient's diet, addressing concerns and/or questions.   She is at risk for lack of exercise and has  been provided with information to increase physical activity for the benefit of her well-being.   The patient was instructed to see the dentist every 6 months.           Marshall Gale is a 34 year old, presenting for the following:  Physical        8/12/2024    10:48 AM   Additional Questions   Roomed by Tatiana        [facility] Care Directive  Patient does not have a Health Care Directive or Living Will: Patient states has Advance Directive and will bring in a copy to clinic.    HPI          8/12/2024   General Health   How would you rate your overall physical health? Good   Feel stress (tense, anxious, or unable to sleep) To some extent      (!) STRESS CONCERN      8/12/2024   Nutrition   Three or more servings of calcium each day? Yes   Diet: Regular (no restrictions)   How many servings of fruit and vegetables per day? (!) 0-1   How many sweetened beverages each day? 0-1            8/12/2024   Exercise   Days per week of moderate/strenous exercise 2 days   Average minutes spent exercising at this level 90 min      (!) EXERCISE CONCERN      8/12/2024   Social Factors   Frequency of gathering with friends or relatives Twice a week   Worry food won't last until get money to buy more No   Food not last or not have enough money for food? No   Do you have housing? (Housing is defined as stable permanent housing and does not include staying ouside in a car, in a tent, in an abandoned building, in an overnight shelter, or couch-surfing.) Yes   Are you worried about losing your housing? No   Lack of transportation? No   Unable to get utilities (heat,electricity)? No            8/12/2024   Dental   Dentist two times every year? (!) NO            8/12/2024   TB Screening   Were you born outside of the [place]? No            Today's PHQ-2 Score:       8/12/2024     8:09 AM   PHQ-2 ( 1999 [employer])   Q1: Little interest or pleasure in doing things 2   Q2: Feeling down, depressed or hopeless 0   PHQ-2 Score 2   Q1: Little  "interest or pleasure in doing things More than half the days   Q2: Feeling down, depressed or hopeless Not at all   PHQ-2 Score 2           8/12/2024   Substance Use   Alcohol more than 3/day or more than 7/wk No   Do you use any other substances recreationally? No        Social History     Tobacco Use    Smoking status: Never    Smokeless tobacco: Never   Vaping Use    Vaping status: Never Used   Substance Use Topics    Alcohol use: No    Drug use: Yes     Types: Marijuana     Comment: 1-2 times weekly                  8/12/2024   STI Screening   New sexual partner(s) since last STI/HIV test? No        History of abnormal Pap smear: No - age 30- 64 PAP with HPV every 5 years recommended        Latest Ref Rng & Units 1/10/2023     3:40 PM 3/6/2015    12:00 AM   PAP / HPV   PAP  Negative for Intraepithelial Lesion or Malignancy (NILM)     PAP (Historical)   NORMAL       HPV 16 DNA Negative Negative     HPV 18 DNA Negative Negative     Other HR HPV Negative Negative         This result is from an external source.           8/12/2024   Contraception/Family Planning   Questions about contraception or family planning No           Reviewed and updated as needed this visit by Provider                         Objective    Exam  /71   Pulse 82   Temp 97.5  F (36.4  C) (Tympanic)   Resp 16   Ht 1.594 m (5' 2.75\")   Wt 48.1 kg (106 lb)   LMP 08/11/2024   SpO2 100%   BMI 18.93 kg/m     Estimated body mass index is 18.93 kg/m  as calculated from the following:    Height as of this encounter: 1.594 m (5' 2.75\").    Weight as of this encounter: 48.1 kg (106 lb).      Physical Exam  Constitutional:       General: She is not in acute distress.     Appearance: Normal appearance. She is not ill-appearing, toxic-appearing or diaphoretic.   HENT:      Head: Normocephalic and atraumatic.   Eyes:      Conjunctiva/sclera: Conjunctivae normal.   Cardiovascular:      Rate and Rhythm: Normal rate and regular rhythm.      Heart " sounds: Normal heart sounds.   Pulmonary:      Effort: Pulmonary effort is normal.      Breath sounds: Normal breath sounds.   Skin:     General: Skin is warm and dry.   Neurological:      Mental Status: She is alert and oriented to person, place, and time.   Psychiatric:         Mood and Affect: Mood normal.         Behavior: Behavior normal.         Thought Content: Thought content normal.         Judgment: Judgment normal.           Signed Electronically by: GLORY Lares CNP

## 2024-08-13 LAB
C TRACH DNA SPEC QL NAA+PROBE: NEGATIVE
N GONORRHOEA DNA SPEC QL NAA+PROBE: NEGATIVE

## 2024-09-06 NOTE — PROGRESS NOTES
SUBJECTIVE:                                                   Baljinder Perdomo is a 34 year old female who presents to clinic today for the following health issue(s): she was referred due to BV.  She reports that she had BV 3 times in one month.  She is now doing well taking boric acid and probiotics.  She has no concerning symptoms today.  Except that she is constantly coughing hard. She says that she just got diagnosed and started treatment for pneumonia.        Current Outpatient Medications   Medication Sig Dispense Refill    lactobacillus rhamnosus, GG, (CULTURELL) capsule Take by mouth 2 times daily      NONFORMULARY Boric acid       No current facility-administered medications for this visit.     No Known Allergies    ROS:  12 point review of systems negative other than symptoms noted below or in the HPI.  No urinary frequency or dysuria, bladder or kidney problems, Normal menstrual cycles      OBJECTIVE:     LMP 08/11/2024   There is no height or weight on file to calculate BMI.    Exam:  Constitutional:  Appearance: Sick, coughing and coughing   Chest:  Respiratory Effort:  Constantly coughing hard. Speaks between coughs.    Psychiatric:  Mentation appears normal and affect normal/bright.     In-Clinic Test Results:  No results found for this or any previous visit (from the past 24 hour(s)).    ASSESSMENT/PLAN:                                                        ICD-10-CM    1. Acute cough  R05.1       2. BV (bacterial vaginosis)  N76.0     B96.89         Please take treatment as recommended for cough/pneumonia.  Please go home and rest.  If any problems follow up with prescribing provider/Urgent Care.    Discussed that BV is not a specific infection and should not be treated unless there are bothersome symptoms.  Every time it is treated, it is more likely that symptoms will reoccur since the antibiotic kills that bothersome and helpful/healthy bacteria.  I agreed to the boric acid and probiotics.  These can be helpful to restoring vaginal health.  Due to her acute pnuemonia today, we did not further explore her sexual health history.    Return to clinic  as needed.          Aiyana Mars CNM  [facility]

## 2024-09-10 ENCOUNTER — OFFICE VISIT (OUTPATIENT)
Dept: OBGYN | Facility: CLINIC | Age: 34
End: 2024-09-10
Attending: ADVANCED PRACTICE MIDWIFE
Payer: COMMERCIAL

## 2024-09-10 VITALS
BODY MASS INDEX: 19.88 KG/M2 | DIASTOLIC BLOOD PRESSURE: 68 MMHG | TEMPERATURE: 98.6 F | OXYGEN SATURATION: 100 % | HEIGHT: 62 IN | HEART RATE: 105 BPM | WEIGHT: 108 LBS | SYSTOLIC BLOOD PRESSURE: 124 MMHG

## 2024-09-10 DIAGNOSIS — B96.89 BV (BACTERIAL VAGINOSIS): ICD-10-CM

## 2024-09-10 DIAGNOSIS — N76.0 BV (BACTERIAL VAGINOSIS): ICD-10-CM

## 2024-09-10 DIAGNOSIS — R05.1 ACUTE COUGH: Primary | ICD-10-CM

## 2024-09-10 PROCEDURE — 99213 OFFICE O/P EST LOW 20 MIN: CPT | Performed by: ADVANCED PRACTICE MIDWIFE

## 2024-09-10 RX ORDER — DOXYCYCLINE 100 MG/1
100 CAPSULE ORAL DAILY
COMMUNITY
Start: 2024-09-08 | End: 2024-09-13

## 2024-09-10 RX ORDER — IPRATROPIUM BROMIDE 21 UG/1
2 SPRAY, METERED NASAL 2 TIMES DAILY
COMMUNITY
Start: 2023-11-26

## 2024-09-10 RX ORDER — AMOXICILLIN 500 MG/1
1000 TABLET, FILM COATED ORAL 3 TIMES DAILY
COMMUNITY
Start: 2024-09-08 | End: 2024-09-13

## 2024-09-10 RX ORDER — DEXTROMETHORPHAN HYDROBROMIDE AND PROMETHAZINE HYDROCHLORIDE 15; 6.25 MG/5ML; MG/5ML
2.5 SYRUP ORAL EVERY 4 HOURS
COMMUNITY
Start: 2024-09-08

## 2024-09-10 RX ORDER — PREDNISONE 20 MG/1
20 TABLET ORAL DAILY
COMMUNITY
Start: 2024-08-30

## 2024-09-10 RX ORDER — ALBUTEROL SULFATE 90 UG/1
1-2 AEROSOL, METERED RESPIRATORY (INHALATION) 4 TIMES DAILY
COMMUNITY
Start: 2023-09-26

## 2024-09-10 RX ORDER — BENZONATATE 200 MG/1
200 CAPSULE ORAL 2 TIMES DAILY PRN
COMMUNITY
Start: 2024-08-30

## 2024-09-10 RX ORDER — GUAIFENESIN 600 MG/1
1200 TABLET, EXTENDED RELEASE ORAL 2 TIMES DAILY
COMMUNITY
Start: 2024-09-08

## 2024-09-10 RX ORDER — FLUTICASONE PROPIONATE 50 MCG
1 SPRAY, SUSPENSION (ML) NASAL DAILY
COMMUNITY
Start: 2024-05-06

## 2024-09-10 RX ORDER — AZITHROMYCIN 250 MG/1
250 TABLET, FILM COATED ORAL DAILY
COMMUNITY
Start: 2024-08-30

## 2024-09-10 RX ORDER — BUDESONIDE AND FORMOTEROL FUMARATE DIHYDRATE 80; 4.5 UG/1; UG/1
2 AEROSOL RESPIRATORY (INHALATION)
COMMUNITY
Start: 2024-09-08

## 2024-09-10 NOTE — NURSING NOTE
"Chief Complaint   Patient presents with    Vaginal Problem     Reocurring bacterial vaginosis & pelvic pain        Initial /68 (BP Location: Right arm, Patient Position: Sitting, Cuff Size: Adult Regular)   Pulse 105   Temp 98.6  F (37  C)   Ht 1.575 m (5' 2\")   Wt 49 kg (108 lb)   LMP 2024 (Exact Date)   SpO2 100%   BMI 19.75 kg/m   Estimated body mass index is 19.75 kg/m  as calculated from the following:    Height as of this encounter: 1.575 m (5' 2\").    Weight as of this encounter: 49 kg (108 lb).  BP completed using cuff size: regular    Questioned patient about current smoking habits.  Pt. has never smoked.          The following HM Due: NONE    Shukri Valdez CMA                "

## 2024-12-02 ENCOUNTER — NURSE TRIAGE (OUTPATIENT)
Dept: INTERNAL MEDICINE | Facility: CLINIC | Age: 34
End: 2024-12-02
Payer: COMMERCIAL

## 2024-12-02 NOTE — TELEPHONE ENCOUNTER
Nurse Triage SBAR    Is this a 2nd Level Triage? YES, LICENSED PRACTITIONER REVIEW IS REQUIRED    Situation: Patient calls to look for referral/order to breast center     Background: Patient noticed small lump in right breast about 1 week ago.     Assessment: Patient notes small lump on right breast near right armpit. No pain, discharge, changes in appearance for breast or nipple. No similar lumps in the past.     Patient says she doesn't have a primary care provider with [facility]. Patient last saw Ella Pedro NP for physical on 08/14/2024.    Patient would like referral sent to [facility] in [place] at [facility] - phone number # - [phone]. Patient says she would like to be evaluated by a second breast center for a second option, so referral/order can also be sent to [facility] so patient can set up second appointment.    Protocol Recommended Disposition:   SEE IN OFFICE WITHIN 3 DAYS:     Recommendation: Patient declines being seen in clinic. Patient looking for referral for breast center  to [facility].    [facility] for [facility] fax # [phone] (place patient's phone number on referral per [facility] policy?).     Routed to provider    Does the patient meet one of the following criteria for ADS visit consideration? 16+ years old, with an [facility] PCP     TIP  Providers, please consider if this condition is appropriate for management at one of our Acute and Diagnostic Services sites.     If patient is a good candidate, please use dotphrase <dot>triageresponse and select Refer to ADS to document.      Reason for Disposition   Breast lump    Additional Information   Negative: Chest pain   Negative: Breastfeeding questions about baby   Negative: Breastfeeding questions about mother (breast symptoms or feeling sick)   Negative: Breastfeeding questions about mother's medicines and diet   Negative: Postpartum breast pain and swelling, not going to continue breastfeeding /  "pumping   Negative: Small spot, skin growth or mole   Negative: SEVERE breast pain and fever > 103 F  (39.4 C)   Negative: Patient sounds very sick or weak to the triager   Negative: Breast looks infected (spreading redness, feels hot or painful to touch) and fever   Negative: Breast looks infected (spreading redness, feels hot or painful to touch) and no fever   Negative: Painful rash and multiple small blisters grouped together (i.e., dermatomal distribution or \"band\" or \"stripe\")   Negative: Cuts, burns, or bruises of breasts and suspicious history for the injury    Answer Assessment - Initial Assessment Questions  1. SYMPTOM: \"What's the main symptom you're concerned about?\"  (e.g., lump, nipple discharge, pain, rash )      Lump.    2. LOCATION: \"Where is the Lump located?\"      Right breast.    3. ONSET: \"When did symptoms  start?\"      1 week.    4. PRIOR HISTORY: \"Do you have any history of prior problems with your breasts?\" (e.g., breast cancer, breast implant, fibrocystic breast disease)      No.    5. CAUSE: \"What do you think is causing this symptom?\"      Not sure.    6. OTHER SYMPTOMS: \"Do you have any other symptoms?\" (e.g., fever, breast pain, nipple discharge, redness or rash)      No    7. PREGNANCY-BREASTFEEDING: \"Is there any chance you are pregnant?\" \"When was your last menstrual period?\" \"Are you breastfeeding?\"      No, LMP - 11/07/2024    Protocols used: Breast Symptoms-A-[place]    "

## 2025-07-07 ENCOUNTER — OFFICE VISIT (OUTPATIENT)
Dept: FAMILY MEDICINE | Facility: CLINIC | Age: 35
End: 2025-07-07

## 2025-07-07 ENCOUNTER — RESULTS FOLLOW-UP (OUTPATIENT)
Dept: FAMILY MEDICINE | Facility: CLINIC | Age: 35
End: 2025-07-07

## 2025-07-07 VITALS
WEIGHT: 106 LBS | DIASTOLIC BLOOD PRESSURE: 65 MMHG | BODY MASS INDEX: 18.78 KG/M2 | TEMPERATURE: 98.2 F | HEIGHT: 63 IN | OXYGEN SATURATION: 100 % | RESPIRATION RATE: 18 BRPM | HEART RATE: 84 BPM | SYSTOLIC BLOOD PRESSURE: 97 MMHG

## 2025-07-07 DIAGNOSIS — N76.0 BV (BACTERIAL VAGINOSIS): Primary | ICD-10-CM

## 2025-07-07 DIAGNOSIS — N76.0 VAGINITIS AND VULVOVAGINITIS: Primary | ICD-10-CM

## 2025-07-07 DIAGNOSIS — L73.9 FOLLICULITIS: ICD-10-CM

## 2025-07-07 DIAGNOSIS — B96.89 BV (BACTERIAL VAGINOSIS): Primary | ICD-10-CM

## 2025-07-07 DIAGNOSIS — Z11.3 SCREEN FOR STD (SEXUALLY TRANSMITTED DISEASE): ICD-10-CM

## 2025-07-07 DIAGNOSIS — Z11.3 SCREENING EXAMINATION FOR VENEREAL DISEASE: ICD-10-CM

## 2025-07-07 LAB
ALBUMIN UR-MCNC: ABNORMAL MG/DL
APPEARANCE UR: CLEAR
BACTERIA #/AREA URNS HPF: ABNORMAL /HPF
BILIRUB UR QL STRIP: NEGATIVE
C TRACH DNA SPEC QL NAA+PROBE: NEGATIVE
CLUE CELLS: PRESENT
COLOR UR AUTO: YELLOW
GLUCOSE UR STRIP-MCNC: NEGATIVE MG/DL
HCG UR QL: NEGATIVE
HGB UR QL STRIP: NEGATIVE
HIV 1+2 AB+HIV1 P24 AG SERPL QL IA: NONREACTIVE
HYALINE CASTS #/AREA URNS LPF: ABNORMAL /LPF
KETONES UR STRIP-MCNC: NEGATIVE MG/DL
LEUKOCYTE ESTERASE UR QL STRIP: ABNORMAL
MUCOUS THREADS #/AREA URNS LPF: PRESENT /LPF
N GONORRHOEA DNA SPEC QL NAA+PROBE: NEGATIVE
NITRATE UR QL: NEGATIVE
PH UR STRIP: 7.5 [PH] (ref 5–7)
RBC #/AREA URNS AUTO: ABNORMAL /HPF
SP GR UR STRIP: 1.01 (ref 1–1.03)
SPECIMEN TYPE: NORMAL
SPECIMEN TYPE: NORMAL
SQUAMOUS #/AREA URNS AUTO: ABNORMAL /LPF
T PALLIDUM AB SER QL: NONREACTIVE
TRICHOMONAS, WET PREP: ABNORMAL
UROBILINOGEN UR STRIP-ACNC: 0.2 E.U./DL
WBC #/AREA URNS AUTO: ABNORMAL /HPF
WBC'S/HIGH POWER FIELD, WET PREP: ABNORMAL
YEAST, WET PREP: ABNORMAL

## 2025-07-07 PROCEDURE — 87591 N.GONORRHOEAE DNA AMP PROB: CPT | Performed by: FAMILY MEDICINE

## 2025-07-07 PROCEDURE — 36415 COLL VENOUS BLD VENIPUNCTURE: CPT | Performed by: FAMILY MEDICINE

## 2025-07-07 PROCEDURE — 81001 URINALYSIS AUTO W/SCOPE: CPT | Performed by: FAMILY MEDICINE

## 2025-07-07 PROCEDURE — G2211 COMPLEX E/M VISIT ADD ON: HCPCS | Performed by: FAMILY MEDICINE

## 2025-07-07 PROCEDURE — 99214 OFFICE O/P EST MOD 30 MIN: CPT | Performed by: FAMILY MEDICINE

## 2025-07-07 PROCEDURE — 87210 SMEAR WET MOUNT SALINE/INK: CPT | Performed by: FAMILY MEDICINE

## 2025-07-07 PROCEDURE — 81025 URINE PREGNANCY TEST: CPT | Performed by: FAMILY MEDICINE

## 2025-07-07 PROCEDURE — 86780 TREPONEMA PALLIDUM: CPT | Performed by: FAMILY MEDICINE

## 2025-07-07 PROCEDURE — 87389 HIV-1 AG W/HIV-1&-2 AB AG IA: CPT | Performed by: FAMILY MEDICINE

## 2025-07-07 PROCEDURE — 87491 CHLMYD TRACH DNA AMP PROBE: CPT | Performed by: FAMILY MEDICINE

## 2025-07-07 RX ORDER — DOXYCYCLINE 100 MG/1
100 CAPSULE ORAL 2 TIMES DAILY
Qty: 14 CAPSULE | Refills: 0 | Status: SHIPPED | OUTPATIENT
Start: 2025-07-07

## 2025-07-07 RX ORDER — METRONIDAZOLE 500 MG/1
500 TABLET ORAL 2 TIMES DAILY
Qty: 14 TABLET | Refills: 0 | Status: SHIPPED | OUTPATIENT
Start: 2025-07-07 | End: 2025-07-14

## 2025-07-07 ASSESSMENT — PATIENT HEALTH QUESTIONNAIRE - PHQ9
SUM OF ALL RESPONSES TO PHQ QUESTIONS 1-9: 15
SUM OF ALL RESPONSES TO PHQ QUESTIONS 1-9: 15
10. IF YOU CHECKED OFF ANY PROBLEMS, HOW DIFFICULT HAVE THESE PROBLEMS MADE IT FOR YOU TO DO YOUR WORK, TAKE CARE OF THINGS AT HOME, OR GET ALONG WITH OTHER PEOPLE: VERY DIFFICULT

## 2025-07-07 NOTE — PATIENT INSTRUCTIONS
Safer Sex: Care Instructions  Overview  Safer sex is a way to reduce your risk of getting a sexually transmitted infection (STI). It can also help prevent pregnancy.  Several products can help you practice safer sex and reduce your chance of STIs. One of the best is a condom. There are internal and external condoms. You can use a special rubber sheet (dental dam) for protection during oral sex. Disposable gloves can keep your hands from touching blood, semen, or other body fluids that can carry infections.  Remember that birth control methods such as diaphragms, IUDs, foams, and birth control pills do not stop you from getting STIs.  Follow-up care is a key part of your treatment and safety. Be sure to make and go to all appointments, and call your doctor if you are having problems. It's also a good idea to know your test results and keep a list of the medicines you take.  How can you care for yourself at home?  Think about getting vaccinated to help prevent hepatitis A, hepatitis B, and human papillomavirus (HPV). They can be spread through sex.  Use a condom every time you have sex. Use an external condom, which goes on the penis. Or use an internal condom, which goes into the vagina or anus.  Make sure you use the right size external condom. A condom that's too small can break easily. A condom that's too big can slip off during sex.  Use a new condom each time you have sex. Be careful not to poke a hole in the condom when you open the wrapper.  Don't use an internal condom and an external condom at the same time.  Never use petroleum jelly (such as Vaseline), grease, hand lotion, baby oil, or anything with oil in it. These products can make holes in the condom.  After intercourse, hold the edge of the condom as you remove it. This will help keep semen from spilling out of the condom.  Do not have sex with anyone who has symptoms of an STI, such as sores on the genitals or mouth.  Do not drink a lot of alcohol or  "use drugs before sex.  Limit your sex partners. Sex with one partner who has sex only with you can reduce your risk of getting an STI.  Don't share sex toys. But if you do share them, use a condom and clean the sex toys between each use.  Talk to any partners before you have sex. Talk about what you feel comfortable with and whether you have any boundaries with sex. And find out if your partner or partners may be at risk for any STI. Keep in mind that a person may be able to spread an STI even if they do not have symptoms. You and any partners may want to get tested for STIs.  Where can you learn more?  Go to https://www.[employer].net/patiented  Enter B608 in the search box to learn more about \"Safer Sex: Care Instructions.\"  Current as of: April 30, 2024  Content Version: 14.5 2024-2025 [employer].   Care instructions adapted under license by your healthcare professional. If you have questions about a medical condition or this instruction, always ask your healthcare professional. [employer] disclaims any warranty or liability for your use of this information.    "

## 2025-07-07 NOTE — PROGRESS NOTES
Assessment & Plan     Recurrent bacterial vaginosis (BV):  - Recurrent BV is a common problem, often associated with sexual activity. Patient has experienced chronic and recurring symptoms over the past year, with limited response to over-the-counter boric acid. Current symptoms include persistent, discolored, malodorous vaginal discharge, and history of heavy, irregular periods associated with BV.  - Plan: Perform a wet prep test to check for BV. If BV is confirmed, prescribe appropriate medication (metronidazole or clindamycin as indicated). Educate patient on the recurrent nature of BV and the association with sexual activity.    Possible sexually transmitted disease (STD):  - Patient is concerned about the possibility of an STD due to ongoing symptoms and non-exclusive sexual relationship. Symptoms overlap with those of STDs and yeast infection.  - Plan: Screen for STDs, including nucleic acid amplification tests for gonorrhea and chlamydia, and serologic testing for syphilis. Perform urinalysis to rule out pregnancy. Communicate results via MyChart or phone call if any unusual findings.    Folliculitis:  - Painful lumps in the armpit area are related to skin infection from shaving, not lymph nodes. Exam reveals tender nodules, some previously drained. Patient shaves underarms and has curly hair, increasing risk for folliculitis and boils.  - Plan: Prescribe oral antibiotics ( doxycycline as indicated) to be taken twice daily for 7 days if symptoms worsen or new large, painful bumps develop. Advise patient to consider alternative hair removal methods such as waxing or depilatory creams to reduce recurrence. Educate on the relationship between shaving and folliculitis.    Consent was obtained from the patient to use an AI documentation tool in the creation of this note.          Depression Screening Follow Up        7/7/2025     8:45 AM   PHQ   PHQ-9 Total Score 15    Q9: Thoughts of better off dead/self-harm  "past 2 weeks Not at all       Patient-reported           Follow Up Actions Taken  Crisis resource information provided in After Visit Summary           Subjective   Baljinder is a 35 year old, presenting for the following health issues:  Vaginal Problem      7/7/2025     9:25 AM   Additional Questions   Roomed by Renae     Vaginal Problem     History of Present Illness       Reason for visit:  Symptoms of BV    She eats 0-1 servings of fruits and vegetables daily.She consumes 1 sweetened beverage(s) daily.She exercises with enough effort to increase her heart rate 60 or more minutes per day.  She exercises with enough effort to increase her heart rate 4 days per week.   She is taking medications regularly.        History of Present Illness-  - Name: Baljinder Perdomo  - Age: 35 years  - Gender: Female  - Reports chronic, recurring symptoms of bacterial vaginosis (BV) throughout the previous year (2024), requiring repeated visits to the doctor and multiple courses of medication, with symptoms not fully resolving. Patient describes BV as \"chronic\" and \"recurring,\" with symptoms that \"wouldn't go away\" despite multiple treatments.  - Used boric acid as suggested by a previous doctor at the main clinic in [place]; initially experienced improvement, but current symptoms have not cleared with boric acid use. This time, boric acid \"did not help.\"  - Patient is unsure if current symptoms are due to BV, a sexually transmitted disease (STD), or a yeast infection, and wants to rule out all possibilities.  - Current symptoms include persistent, discolored (off-yellow to greenish), malodorous vaginal discharge, described as \"off yellow and it looks greeny to me, like greenish,\" and \"smelly.\"  - Reports a single episode of severe pain radiating from the pelvic floor to the anus a few days prior to the visit; pain resolved and was associated with being gassy. This was the first time experiencing this type of pain.  - Ongoing " "constant abdominal pain described as uncomfortable and dull.  - Reports increased vaginal itching, especially when sweaty, but no current issues with vaginal itching outside of sweating.  - Anticipates onset of menstrual period around July 15-17, 2025; notes that BV often affects timing and intensity of periods, with the last period described as unusually heavy and intense.  - Reports several painful lumps in the armpits, some small and some larger; one lump previously drained and resolved, others remain and are painful, especially when pressing or lowering arms. Describes these as \"swollen lymph nodes\" and requests evaluation to determine if they are lymph nodes.  - Shaved underarms the day prior to the visit.  - Sexually active with the same male partner since the beginning of 2025; relationship is not exclusive, and both partners may have other sexual partners. Consistently uses condoms for protection. Denies any chance of pregnancy. Last sexual intercourse occurred approximately three weeks prior to the visit. No current use of birth control other than condoms.  - Last Pap smear was in 2023 and results were reported as good.  - Expresses concern about the possibility of a sexually transmitted infection (STI) or yeast infection as alternative explanations for symptoms.    Vaginal Symptoms  Onset/Duration: 3 weeks  Description:  Vaginal Discharge: creamy   Itching (Pruritis): No  Burning sensation:  YES  Odor: YES  Accompanying Signs & Symptoms:  Urinary symptoms: No  Abdominal pain: YES  Fever: No  History:   Sexually active: YES  New Partner: No  Possibility of Pregnancy:  No  Recent antibiotic use: No  Previous vaginitis issues: YES  Precipitating or alleviating factors: None  Therapies tried and outcome: none        Review of Systems  Constitutional, HEENT, cardiovascular, pulmonary, gi and gu systems are negative, except as otherwise noted.      Objective    BP 97/65 (BP Location: Left arm, Patient Position: " "Chair, Cuff Size: Adult Regular)   Pulse 84   Temp 98.2  F (36.8  C) (Oral)   Resp 18   Ht 1.6 m (5' 3\")   Wt 48.1 kg (106 lb)   LMP 06/17/2025 (Exact Date)   SpO2 100%   BMI 18.78 kg/m    Body mass index is 18.78 kg/m .  Physical Exam   GENERAL: alert and no distress   (female) w/bimanual: normal female external genitalia, normal urethral meatus, normal vaginal mucosa, normal cervix/adnexa/uterus without masses or discharge  LYMPH: axilla ; reveal small round slightly papinful nodules subcutaneously, on both axilla.    Recent Results (from the past 24 hours)   Wet prep - Clinic Collect    Specimen: Vagina; Swab   Result Value Ref Range    Trichomonas Absent Absent    Yeast Absent Absent    Clue Cells Present (A) Absent    WBCs/high power field 1+ (A) None   UA Macroscopic with reflex to Microscopic and Culture - Lab Collect    Specimen: Urine, Clean Catch   Result Value Ref Range    Color Urine Yellow Colorless, Straw, Light Yellow, Yellow    Appearance Urine Clear Clear    Glucose Urine Negative Negative mg/dL    Bilirubin Urine Negative Negative    Ketones Urine Negative Negative mg/dL    Specific Gravity Urine 1.015 1.003 - 1.035    Blood Urine Negative Negative    pH Urine 7.5 (H) 5.0 - 7.0    Protein Albumin Urine Trace (A) Negative mg/dL    Urobilinogen Urine 0.2 0.2, 1.0 E.U./dL    Nitrite Urine Negative Negative    Leukocyte Esterase Urine Trace (A) Negative   UA Microscopic with Reflex to Culture   Result Value Ref Range    Bacteria Urine Few (A) None Seen /HPF    RBC Urine 0-2 0-2 /HPF /HPF    WBC Urine 0-5 0-5 /HPF /HPF    Squamous Epithelials Urine Few (A) None Seen /LPF    Mucus Urine Present (A) None Seen /LPF    Hyaline Casts Urine 0-2 (A) None Seen /LPF    Narrative    Urine Culture not indicated           Signed Electronically by: Javier Quintanilla MD    "

## 2025-07-07 NOTE — TELEPHONE ENCOUNTER
Attempt x 1 to reach patient, no answer.  VM left, please call clinic back at [phone] and ask to speak to a triage nurse.    Judith West RN BSN  Clinic Nurse  [facility]

## 2025-07-07 NOTE — TELEPHONE ENCOUNTER
Received call back from patient and relayed the below.     Patient verbalizes understanding and denies having any further questions or concerns at this time.     Leta NAPOLES RN  [facility] (formerly [facility]3]